# Patient Record
Sex: FEMALE | Race: WHITE | NOT HISPANIC OR LATINO | Employment: STUDENT | URBAN - METROPOLITAN AREA
[De-identification: names, ages, dates, MRNs, and addresses within clinical notes are randomized per-mention and may not be internally consistent; named-entity substitution may affect disease eponyms.]

---
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
## 2021-02-10 ENCOUNTER — OFFICE VISIT (OUTPATIENT)
Dept: URGENT CARE | Facility: PHYSICIAN GROUP | Age: 21
End: 2021-02-10
Payer: COMMERCIAL

## 2021-02-10 VITALS
DIASTOLIC BLOOD PRESSURE: 70 MMHG | WEIGHT: 155 LBS | BODY MASS INDEX: 23.49 KG/M2 | RESPIRATION RATE: 16 BRPM | OXYGEN SATURATION: 98 % | SYSTOLIC BLOOD PRESSURE: 110 MMHG | HEART RATE: 75 BPM | TEMPERATURE: 99 F | HEIGHT: 68 IN

## 2021-02-10 DIAGNOSIS — J02.0 STREP PHARYNGITIS: ICD-10-CM

## 2021-02-10 DIAGNOSIS — J02.9 PHARYNGITIS, UNSPECIFIED ETIOLOGY: ICD-10-CM

## 2021-02-10 LAB
INT CON NEG: NORMAL
INT CON POS: NORMAL
S PYO AG THROAT QL: POSITIVE

## 2021-02-10 PROCEDURE — 87880 STREP A ASSAY W/OPTIC: CPT | Performed by: PHYSICIAN ASSISTANT

## 2021-02-10 PROCEDURE — 99214 OFFICE O/P EST MOD 30 MIN: CPT | Performed by: PHYSICIAN ASSISTANT

## 2021-02-10 RX ORDER — AMOXICILLIN 500 MG/1
500 CAPSULE ORAL 2 TIMES DAILY
Qty: 20 CAPSULE | Refills: 0 | Status: SHIPPED | OUTPATIENT
Start: 2021-02-10 | End: 2021-02-20

## 2021-02-10 ASSESSMENT — ENCOUNTER SYMPTOMS
FEVER: 0
ABDOMINAL PAIN: 0
DIARRHEA: 0
MYALGIAS: 0
COUGH: 1
HEADACHES: 1
CHILLS: 0
VOMITING: 0
EYE PAIN: 0
CONSTIPATION: 0
SORE THROAT: 1
NAUSEA: 0
SHORTNESS OF BREATH: 0

## 2021-02-10 ASSESSMENT — FIBROSIS 4 INDEX: FIB4 SCORE: 0.53

## 2021-02-10 NOTE — PROGRESS NOTES
Subjective:   Avani Hutchinson is a 21 y.o. female who presents for Ear Pain (bilateral ear pain, cough, sore throat, headaches, congestion, x5 days )      HPI:  Is a 21-year-old female presents to urgent care complaining of around 5 days of sore throat, headaches, mild congestion, mild bilateral ear pressure but all seems to be getting worse around 5 days.  She has no sick contacts and note that she did contract Covid several months ago and fully recovered.  She reports that she is concerned about strep throat or a sinus infection.    Review of Systems   Constitutional: Positive for malaise/fatigue. Negative for chills and fever.   HENT: Positive for congestion, ear pain and sore throat. Negative for hearing loss and tinnitus.    Eyes: Negative for pain.   Respiratory: Positive for cough. Negative for shortness of breath.    Cardiovascular: Negative for chest pain.   Gastrointestinal: Negative for abdominal pain, constipation, diarrhea, nausea and vomiting.   Genitourinary: Negative for dysuria.   Musculoskeletal: Negative for myalgias.   Skin: Negative for rash.   Neurological: Positive for headaches.       Medications:    • Adapalene Gel  • amoxicillin Caps  • ascorbic acid Tabs  • B COMPLEX 1 PO  • BIOTIN PO  • DAYQUIL PO  • DIGESTIVE ENZYMES PO  • minocycline Caps  • norethindrone  • PEPTO-BISMOL PO  • vitamin D Tabs    Allergies: Naproxen    Problem List: Avani Hutchinson has Fatigue; Neck pain; Scoliosis of thoracic spine; Migraine with aura and with status migrainosus, not intractable; and Chronic pain of left ankle on their problem list.    Surgical History:  No past surgical history on file.    Past Social Hx: Avani Hutchinson  reports that she has never smoked. She has never used smokeless tobacco. She reports that she does not drink alcohol and does not use drugs.     Past Family Hx:  Avani Hutchinson family history includes Anxiety disorder in her paternal grandfather; Arrythmia in her maternal  "grandfather; Cancer in her maternal grandmother, mother, and paternal grandmother; Hypertension in her maternal grandfather; Thyroid in her maternal grandfather.     Problem list, medications, and allergies reviewed by myself today in Epic.     Objective:     /70 (BP Location: Right arm, Patient Position: Sitting, BP Cuff Size: Adult)   Pulse 75   Temp 37.2 °C (99 °F) (Temporal)   Resp 16   Ht 1.727 m (5' 8\")   Wt 70.3 kg (155 lb)   SpO2 98%   BMI 23.57 kg/m²     Physical Exam  Vitals reviewed.   Constitutional:       Appearance: Normal appearance.   HENT:      Head: Normocephalic and atraumatic.      Right Ear: Tympanic membrane, ear canal and external ear normal.      Left Ear: Tympanic membrane, ear canal and external ear normal.      Nose: Rhinorrhea present. No congestion.      Mouth/Throat:      Mouth: Mucous membranes are moist.      Comments: Mild posterior pharyngeal erythema, no tonsillar hypertrophy or exudate.  Scant petechiae of the palate  Eyes:      Conjunctiva/sclera: Conjunctivae normal.   Cardiovascular:      Rate and Rhythm: Normal rate and regular rhythm.   Pulmonary:      Effort: Pulmonary effort is normal.      Breath sounds: Normal breath sounds.   Musculoskeletal:      Cervical back: Normal range of motion. No rigidity.   Lymphadenopathy:      Cervical: Cervical adenopathy (Anterior) present.   Skin:     General: Skin is warm and dry.      Capillary Refill: Capillary refill takes less than 2 seconds.   Neurological:      Mental Status: She is alert and oriented to person, place, and time.         Lab Results/POC Test Results   Results for orders placed or performed in visit on 02/10/21   POCT Rapid Strep A   Result Value Ref Range    Rapid Strep Screen positive     Internal Control Positive Valid     Internal Control Negative Valid              Assessment/Plan:     Diagnosis and associated orders:     1. Pharyngitis, unspecified etiology  POCT Rapid Strep A   2. Strep pharyngitis "  amoxicillin (AMOXIL) 500 MG Cap      Comments/MDM:     • Rapid strep positive  • We will treat with amoxicillin, reviewed previous medications and she tolerated this in the past  • Patient encouraged complete full course of antibiotics  • Patient encouraged to replace any dental instruments after 3 days of antibiotics  • Discussed return precautions for peritonsillar abscess and other rare pathologies patient demonstrated understand that she will return if not improving or if getting worse         Differential diagnosis, natural history, supportive care, and indications for immediate follow-up discussed.    Advised the patient to follow-up with the primary care physician for recheck, reevaluation, and consideration of further management.    Please note that this dictation was created using voice recognition software. I have made a reasonable attempt to correct obvious errors, but I expect that there are errors of grammar and possibly content that I did not discover before finalizing the note.    This note was electronically signed by Omi Floyd PA-C

## 2021-03-05 ENCOUNTER — TELEPHONE (OUTPATIENT)
Dept: MEDICAL GROUP | Facility: IMAGING CENTER | Age: 21
End: 2021-03-05

## 2021-03-05 NOTE — TELEPHONE ENCOUNTER
Received message from patient regarding immunizations. Patient states her mychart shows she is overdue for her meningococcal B vaccine. Patient states she thought she was up to date on her vaccines. Patient states if she is due and Dr. Mcgraw thinks she should get this vaccine she would like to schedule.     Called and lm for patient to return call.

## 2021-03-08 NOTE — TELEPHONE ENCOUNTER
The meningococcal B vaccine is recommended for high risk groups, thus we can discuss at a future well visit for patient. If she is living in a dorm or going into the  she would be recommended to complete vaccine otherwise.

## 2021-03-17 NOTE — TELEPHONE ENCOUNTER
Called and spoke with patient. Notified her of Dr. Vallejo recommendations. Patient states she currently does not live in a dorm room. Patient states she is not interested in vaccine at this time. Will plan to make an appointment with Dr. Mcgraw if she has any further questions.

## 2021-04-03 ENCOUNTER — HOSPITAL ENCOUNTER (OUTPATIENT)
Facility: MEDICAL CENTER | Age: 21
End: 2021-04-03
Attending: PHYSICIAN ASSISTANT
Payer: COMMERCIAL

## 2021-04-03 ENCOUNTER — OFFICE VISIT (OUTPATIENT)
Dept: URGENT CARE | Facility: CLINIC | Age: 21
End: 2021-04-03
Payer: COMMERCIAL

## 2021-04-03 VITALS
BODY MASS INDEX: 22.73 KG/M2 | HEIGHT: 68 IN | SYSTOLIC BLOOD PRESSURE: 110 MMHG | HEART RATE: 70 BPM | OXYGEN SATURATION: 100 % | WEIGHT: 150 LBS | DIASTOLIC BLOOD PRESSURE: 64 MMHG | RESPIRATION RATE: 16 BRPM | TEMPERATURE: 98.9 F

## 2021-04-03 DIAGNOSIS — R53.83 FATIGUE, UNSPECIFIED TYPE: ICD-10-CM

## 2021-04-03 DIAGNOSIS — R68.89 FLU-LIKE SYMPTOMS: ICD-10-CM

## 2021-04-03 LAB
HETEROPH AB SER QL LA: NEGATIVE
INT CON NEG: NORMAL
INT CON NEG: NORMAL
INT CON POS: NORMAL
INT CON POS: NORMAL
S PYO AG THROAT QL: NEGATIVE

## 2021-04-03 PROCEDURE — 87880 STREP A ASSAY W/OPTIC: CPT | Performed by: PHYSICIAN ASSISTANT

## 2021-04-03 PROCEDURE — 0240U HCHG SARS-COV-2 COVID-19 NFCT DS RESP RNA 3 TRGT MIC: CPT

## 2021-04-03 PROCEDURE — 99213 OFFICE O/P EST LOW 20 MIN: CPT | Performed by: PHYSICIAN ASSISTANT

## 2021-04-03 PROCEDURE — 86308 HETEROPHILE ANTIBODY SCREEN: CPT | Performed by: PHYSICIAN ASSISTANT

## 2021-04-03 ASSESSMENT — ENCOUNTER SYMPTOMS
CHANGE IN BOWEL HABIT: 0
SORE THROAT: 0
SHORTNESS OF BREATH: 0
CHILLS: 0
FEVER: 1
SWOLLEN GLANDS: 0
NAUSEA: 0
VOMITING: 0
HEADACHES: 0
FATIGUE: 1
DIARRHEA: 0
ABDOMINAL PAIN: 0
COUGH: 0
WHEEZING: 0
MYALGIAS: 1

## 2021-04-03 ASSESSMENT — FIBROSIS 4 INDEX: FIB4 SCORE: 0.53

## 2021-04-03 NOTE — PROGRESS NOTES
Subjective:      Avani Hutchinson is a 21 y.o. female who presents with Fatigue (major fatigue and muscle aches x 2 weeks)            Fatigue  This is a new problem. Episode onset: 4 days  The problem occurs constantly. The problem has been gradually improving (slight improvement ). Associated symptoms include fatigue, a fever (100F 3 days ago ) and myalgias. Pertinent negatives include no abdominal pain, change in bowel habit, chest pain, chills, congestion, coughing, headaches, nausea, rash, sore throat, swollen glands, urinary symptoms or vomiting. Nothing aggravates the symptoms. She has tried nothing for the symptoms.     The patient states she had COVID 7 months ago and received her first  Vaccine 2 weeks ago.  She was treated for strep 3 weeks ago.       Past Medical History:   Diagnosis Date   • Migraine 11/2017   • Sinusitis 09/2018       History reviewed. No pertinent surgical history.    Family History   Problem Relation Age of Onset   • Cancer Mother         melanoma   • Cancer Maternal Grandmother         NHL, uterine cancer   • Arrythmia Maternal Grandfather    • Thyroid Maternal Grandfather    • Hypertension Maternal Grandfather    • Cancer Paternal Grandmother         melanoma   • Anxiety disorder Paternal Grandfather        Allergies   Allergen Reactions   • Naproxen Diarrhea     N/V/D       Medications, Allergies, and current problem list reviewed today in Epic    Review of Systems   Constitutional: Positive for fatigue, fever (100F 3 days ago ) and malaise/fatigue. Negative for chills.   HENT: Negative for congestion and sore throat.    Respiratory: Negative for cough, shortness of breath and wheezing.    Cardiovascular: Negative for chest pain.   Gastrointestinal: Negative for abdominal pain, change in bowel habit, diarrhea, nausea and vomiting.   Genitourinary: Negative for dysuria, frequency, hematuria and urgency.   Musculoskeletal: Positive for myalgias.   Skin: Negative for rash.  "  Neurological: Negative for headaches.     All other systems reviewed and are negative.          Objective:     /64 (BP Location: Left arm, Patient Position: Sitting, BP Cuff Size: Adult)   Pulse 70   Temp 37.2 °C (98.9 °F) (Temporal)   Resp 16   Ht 1.727 m (5' 8\")   Wt 68 kg (150 lb)   SpO2 100%   BMI 22.81 kg/m²      Physical Exam  Constitutional:       General: She is not in acute distress.  HENT:      Head: Normocephalic and atraumatic.      Nose: Nose normal.   Eyes:      Conjunctiva/sclera: Conjunctivae normal.   Cardiovascular:      Rate and Rhythm: Normal rate and regular rhythm.      Heart sounds: No murmur.   Pulmonary:      Effort: Pulmonary effort is normal. No respiratory distress.      Breath sounds: Normal breath sounds. No wheezing, rhonchi or rales.   Lymphadenopathy:      Cervical: No cervical adenopathy.   Skin:     General: Skin is warm and dry.   Neurological:      General: No focal deficit present.      Mental Status: She is alert and oriented to person, place, and time.   Psychiatric:         Mood and Affect: Mood normal.         Behavior: Behavior normal.         Thought Content: Thought content normal.         Judgment: Judgment normal.          poct strep- negative  poct mono- negative          Assessment/Plan:        1. Fatigue, unspecified type    - POCT Rapid Strep A  - POCT Mononucleosis (mono)  - CoV-2 and Flu A/B by PCR (24 hour In-House): Collect NP swab in VTM; Future    2. Flu-like symptoms    - CoV-2 and Flu A/B by PCR (24 hour In-House): Collect NP swab in VTM; Future    COVID test pending.   If not COVID, likely other viral illness.   Encouraged conservative treatment.  Isolation guidelines, conservative measures and ER precautions discussed.     COVID handout given.    Differential diagnoses, Supportive care, and indications for immediate follow-up discussed with patient.   Pathogenesis of diagnosis discussed including typical length and natural progression. "   Instructed to return to clinic or nearest emergency department for any change in condition, further concerns, or worsening of symptoms.    The patient demonstrated a good understanding and agreed with the treatment plan.    Martha Corona P.A.-C.

## 2021-04-05 LAB
FLUAV RNA SPEC QL NAA+PROBE: NEGATIVE
FLUBV RNA SPEC QL NAA+PROBE: NEGATIVE
SARS-COV-2 RNA RESP QL NAA+PROBE: NOTDETECTED
SPECIMEN SOURCE: NORMAL

## 2021-04-06 ENCOUNTER — TELEPHONE (OUTPATIENT)
Dept: URGENT CARE | Facility: CLINIC | Age: 21
End: 2021-04-06

## 2021-04-06 NOTE — TELEPHONE ENCOUNTER
Patient called asking for Lab results for COVID test. They also were wondering if the results could be added to their MyChart. I let them know that I would send the provider a message to give her a call.

## 2021-05-11 ENCOUNTER — HOSPITAL ENCOUNTER (EMERGENCY)
Facility: MEDICAL CENTER | Age: 21
End: 2021-05-11
Attending: EMERGENCY MEDICINE
Payer: COMMERCIAL

## 2021-05-11 VITALS
HEART RATE: 63 BPM | HEIGHT: 68 IN | SYSTOLIC BLOOD PRESSURE: 97 MMHG | WEIGHT: 149.25 LBS | BODY MASS INDEX: 22.62 KG/M2 | OXYGEN SATURATION: 98 % | DIASTOLIC BLOOD PRESSURE: 65 MMHG | RESPIRATION RATE: 18 BRPM | TEMPERATURE: 97 F

## 2021-05-11 DIAGNOSIS — R11.2 NAUSEA AND VOMITING, INTRACTABILITY OF VOMITING NOT SPECIFIED, UNSPECIFIED VOMITING TYPE: ICD-10-CM

## 2021-05-11 DIAGNOSIS — R10.84 GENERALIZED ABDOMINAL PAIN: ICD-10-CM

## 2021-05-11 LAB
ALBUMIN SERPL BCP-MCNC: 4.4 G/DL (ref 3.2–4.9)
ALBUMIN/GLOB SERPL: 1.5 G/DL
ALP SERPL-CCNC: 62 U/L (ref 30–99)
ALT SERPL-CCNC: 8 U/L (ref 2–50)
AMPHET UR QL SCN: NEGATIVE
ANION GAP SERPL CALC-SCNC: 9 MMOL/L (ref 7–16)
APPEARANCE UR: CLEAR
AST SERPL-CCNC: 14 U/L (ref 12–45)
BARBITURATES UR QL SCN: NEGATIVE
BASOPHILS # BLD AUTO: 0.1 % (ref 0–1.8)
BASOPHILS # BLD: 0.01 K/UL (ref 0–0.12)
BENZODIAZ UR QL SCN: NEGATIVE
BILIRUB SERPL-MCNC: 0.5 MG/DL (ref 0.1–1.5)
BILIRUB UR QL STRIP.AUTO: NEGATIVE
BUN SERPL-MCNC: 11 MG/DL (ref 8–22)
BZE UR QL SCN: NEGATIVE
CALCIUM SERPL-MCNC: 9.3 MG/DL (ref 8.4–10.2)
CANNABINOIDS UR QL SCN: NEGATIVE
CHLORIDE SERPL-SCNC: 103 MMOL/L (ref 96–112)
CO2 SERPL-SCNC: 25 MMOL/L (ref 20–33)
COLOR UR: YELLOW
CREAT SERPL-MCNC: 0.83 MG/DL (ref 0.5–1.4)
EOSINOPHIL # BLD AUTO: 0.04 K/UL (ref 0–0.51)
EOSINOPHIL NFR BLD: 0.4 % (ref 0–6.9)
ERYTHROCYTE [DISTWIDTH] IN BLOOD BY AUTOMATED COUNT: 40.4 FL (ref 35.9–50)
GLOBULIN SER CALC-MCNC: 3 G/DL (ref 1.9–3.5)
GLUCOSE SERPL-MCNC: 93 MG/DL (ref 65–99)
GLUCOSE UR STRIP.AUTO-MCNC: NEGATIVE MG/DL
HCG SERPL QL: NEGATIVE
HCT VFR BLD AUTO: 48.5 % (ref 37–47)
HGB BLD-MCNC: 15.9 G/DL (ref 12–16)
IMM GRANULOCYTES # BLD AUTO: 0.03 K/UL (ref 0–0.11)
IMM GRANULOCYTES NFR BLD AUTO: 0.3 % (ref 0–0.9)
KETONES UR STRIP.AUTO-MCNC: NEGATIVE MG/DL
LEUKOCYTE ESTERASE UR QL STRIP.AUTO: NEGATIVE
LIPASE SERPL-CCNC: 23 U/L (ref 7–58)
LYMPHOCYTES # BLD AUTO: 1.46 K/UL (ref 1–4.8)
LYMPHOCYTES NFR BLD: 16.4 % (ref 22–41)
MCH RBC QN AUTO: 30.7 PG (ref 27–33)
MCHC RBC AUTO-ENTMCNC: 32.8 G/DL (ref 33.6–35)
MCV RBC AUTO: 93.6 FL (ref 81.4–97.8)
METHADONE UR QL SCN: NEGATIVE
MICRO URNS: NORMAL
MONOCYTES # BLD AUTO: 0.56 K/UL (ref 0–0.85)
MONOCYTES NFR BLD AUTO: 6.3 % (ref 0–13.4)
NEUTROPHILS # BLD AUTO: 6.8 K/UL (ref 2–7.15)
NEUTROPHILS NFR BLD: 76.5 % (ref 44–72)
NITRITE UR QL STRIP.AUTO: NEGATIVE
NRBC # BLD AUTO: 0 K/UL
NRBC BLD-RTO: 0 /100 WBC
OPIATES UR QL SCN: NEGATIVE
OXYCODONE UR QL SCN: NEGATIVE
PCP UR QL SCN: NEGATIVE
PH UR STRIP.AUTO: 6.5 [PH] (ref 5–8)
PLATELET # BLD AUTO: 238 K/UL (ref 164–446)
PMV BLD AUTO: 10.9 FL (ref 9–12.9)
POTASSIUM SERPL-SCNC: 3.8 MMOL/L (ref 3.6–5.5)
PROPOXYPH UR QL SCN: NEGATIVE
PROT SERPL-MCNC: 7.4 G/DL (ref 6–8.2)
PROT UR QL STRIP: NEGATIVE MG/DL
RBC # BLD AUTO: 5.18 M/UL (ref 4.2–5.4)
RBC UR QL AUTO: NEGATIVE
SODIUM SERPL-SCNC: 137 MMOL/L (ref 135–145)
SP GR UR STRIP.AUTO: 1.01
WBC # BLD AUTO: 8.9 K/UL (ref 4.8–10.8)

## 2021-05-11 PROCEDURE — 81003 URINALYSIS AUTO W/O SCOPE: CPT

## 2021-05-11 PROCEDURE — 700111 HCHG RX REV CODE 636 W/ 250 OVERRIDE (IP): Performed by: EMERGENCY MEDICINE

## 2021-05-11 PROCEDURE — 84703 CHORIONIC GONADOTROPIN ASSAY: CPT

## 2021-05-11 PROCEDURE — 700105 HCHG RX REV CODE 258: Performed by: EMERGENCY MEDICINE

## 2021-05-11 PROCEDURE — 99284 EMERGENCY DEPT VISIT MOD MDM: CPT

## 2021-05-11 PROCEDURE — 96375 TX/PRO/DX INJ NEW DRUG ADDON: CPT

## 2021-05-11 PROCEDURE — 80307 DRUG TEST PRSMV CHEM ANLYZR: CPT

## 2021-05-11 PROCEDURE — 80053 COMPREHEN METABOLIC PANEL: CPT

## 2021-05-11 PROCEDURE — 83690 ASSAY OF LIPASE: CPT

## 2021-05-11 PROCEDURE — 85025 COMPLETE CBC W/AUTO DIFF WBC: CPT

## 2021-05-11 PROCEDURE — 96374 THER/PROPH/DIAG INJ IV PUSH: CPT

## 2021-05-11 RX ORDER — SODIUM CHLORIDE, SODIUM LACTATE, POTASSIUM CHLORIDE, CALCIUM CHLORIDE 600; 310; 30; 20 MG/100ML; MG/100ML; MG/100ML; MG/100ML
2000 INJECTION, SOLUTION INTRAVENOUS ONCE
Status: COMPLETED | OUTPATIENT
Start: 2021-05-11 | End: 2021-05-11

## 2021-05-11 RX ORDER — PROMETHAZINE HYDROCHLORIDE 25 MG/1
25 SUPPOSITORY RECTAL EVERY 6 HOURS PRN
Qty: 5 SUPPOSITORY | Refills: 0 | Status: SHIPPED | OUTPATIENT
Start: 2021-05-11 | End: 2021-05-18

## 2021-05-11 RX ORDER — IBUPROFEN 200 MG
200 TABLET ORAL EVERY 6 HOURS PRN
Status: SHIPPED | COMMUNITY
End: 2022-02-17

## 2021-05-11 RX ORDER — ONDANSETRON 2 MG/ML
4 INJECTION INTRAMUSCULAR; INTRAVENOUS ONCE
Status: COMPLETED | OUTPATIENT
Start: 2021-05-11 | End: 2021-05-11

## 2021-05-11 RX ORDER — PROMETHAZINE HYDROCHLORIDE 25 MG/1
25 TABLET ORAL EVERY 6 HOURS PRN
Qty: 15 TABLET | Refills: 0 | Status: SHIPPED | OUTPATIENT
Start: 2021-05-11 | End: 2021-05-18

## 2021-05-11 RX ORDER — DIPHENHYDRAMINE HYDROCHLORIDE 50 MG/ML
50 INJECTION INTRAMUSCULAR; INTRAVENOUS ONCE
Status: COMPLETED | OUTPATIENT
Start: 2021-05-11 | End: 2021-05-11

## 2021-05-11 RX ADMIN — DIPHENHYDRAMINE HYDROCHLORIDE 50 MG: 50 INJECTION INTRAMUSCULAR; INTRAVENOUS at 10:05

## 2021-05-11 RX ADMIN — SODIUM CHLORIDE, POTASSIUM CHLORIDE, SODIUM LACTATE AND CALCIUM CHLORIDE 2000 ML: 600; 310; 30; 20 INJECTION, SOLUTION INTRAVENOUS at 10:05

## 2021-05-11 RX ADMIN — ONDANSETRON 4 MG: 2 INJECTION INTRAMUSCULAR; INTRAVENOUS at 10:05

## 2021-05-11 ASSESSMENT — FIBROSIS 4 INDEX: FIB4 SCORE: 0.53

## 2021-05-11 NOTE — ED NOTES
Tolerated PO fluids without problem.  Agreeable to discharge.  Follow up instructions discussed w/ pt and her mom and understanding verbalized.  Ambulatory out of ED.  RX x 2 sent electronically.

## 2021-05-11 NOTE — ED TRIAGE NOTES
"Chief Complaint   Patient presents with   • Abdominal Pain     \"really bad intestinal pains\" started during the NOC   • Nausea/Vomiting/Diarrhea     /73   Pulse 82   Temp 36.1 °C (97 °F) (Temporal)   Resp 18   Ht 1.727 m (5' 8\")   Wt 67.7 kg (149 lb 4 oz)   SpO2 94%   BMI 22.69 kg/m²     Pt ambulated to ED w/ family member for c/o abd pain and N/V/D, denies HX of same.    Covid Screen Negative.  Pt has received Covid Vaccines x 2.            "

## 2021-05-11 NOTE — ED NOTES
Med rec updated and complete  Allergies reviewed  Interviewed pt with mother at bedside with permission from pt  Pt reports no vitamins     No current facility-administered medications on file prior to encounter.     Current Outpatient Medications on File Prior to Encounter   Medication Sig Dispense Refill   • Etonogestrel (NEXPLANON SC) Inject 1 Each under the skin see administration instructions. Every 3 years     • Minocycline HCl Micronized 1.5 % Foam Apply 1 Application topically every day. Apply's to face     • DM-Doxylamine-Acetaminophen (QC NIGHTTIME MULTI-SYMPTOM PO) Take 2 Capsules by mouth as needed (For cold symptoms).     • Aspirin-Acetaminophen-Caffeine (EXCEDRIN MIGRAINE PO) Take 2 Tablets by mouth as needed (For migraine).     • ibuprofen (MOTRIN) 200 MG Tab Take 200 mg by mouth every 6 hours as needed for Mild Pain.     • Bismuth Subsalicylate (PEPTO-BISMOL PO) Take 2 Tablets by mouth every 6 hours as needed (For upset stomach).     • Adapalene 0.3 % Gel Apply 1 Application topically at bedtime. Apply's to face  3

## 2021-05-11 NOTE — ED NOTES
Pt back to room  Reports she was unable to collect urine sample  Mother at bedside  PIV placed, labs collected    Pt dry heaving into emesis bag

## 2021-05-11 NOTE — ED PROVIDER NOTES
"ED Provider Note    CHIEF COMPLAINT  Chief Complaint   Patient presents with    Abdominal Pain     \"really bad intestinal pains\" started during the NOC    Nausea/Vomiting/Diarrhea        HPI  Avani Hutchinson is a 21 y.o. female who presents to the ED with complaints of abdominal cramps nausea vomiting diarrhea.  The patient states that last night she started having some cramps then it turned into diarrhea and vomiting.  She been having severe cramps to defined as a 10/10 type pain that tends to make her ball over then she will vomit and have diarrhea and the pain will subside.  Patient denies any overt fevers does describe chills denies any dysuria.  States the symptoms started last night about midnight.  Patient denies any other symptoms.    REVIEW OF SYSTEMS  See HPI for further details. All other systems are negative.     PAST MEDICAL HISTORY  Past Medical History:   Diagnosis Date    COVID-19     Migraine 11/2017    Sinusitis 09/2018       FAMILY HISTORY  Family History   Problem Relation Age of Onset    Cancer Mother         melanoma    Cancer Maternal Grandmother         NHL, uterine cancer    Arrythmia Maternal Grandfather     Thyroid Maternal Grandfather     Hypertension Maternal Grandfather     Cancer Paternal Grandmother         melanoma    Anxiety disorder Paternal Grandfather        SOCIAL HISTORY  Social History     Socioeconomic History    Marital status: Single     Spouse name: Not on file    Number of children: Not on file    Years of education: Not on file    Highest education level: Not on file   Occupational History    Occupation: Student     Comment: [employer]   Tobacco Use    Smoking status: Never Smoker    Smokeless tobacco: Never Used   Substance and Sexual Activity    Alcohol use: Yes    Drug use: No    Sexual activity: Never   Other Topics Concern    Behavioral problems Not Asked    Interpersonal relationships Not Asked    Sad or not enjoying activities Not Asked    Suicidal " thoughts Not Asked    Poor school performance Not Asked    Reading difficulties Not Asked    Speech difficulties Not Asked    Writing difficulties Not Asked    Inadequate sleep Not Asked    Excessive TV viewing Not Asked    Excessive video game use Not Asked    Inadequate exercise Not Asked    Sports related Not Asked    Poor diet Not Asked    Family concerns for drug/alcohol abuse Not Asked    Poor oral hygiene Not Asked    Bike safety Not Asked    Family concerns vehicle safety Not Asked   Social History Narrative    Senior in high school.     Parents - 50/50 split time.      Social Determinants of Health     Financial Resource Strain:     Difficulty of Paying Living Expenses:    Food Insecurity:     Worried About Running Out of Food in the Last Year:     Ran Out of Food in the Last Year:    Transportation Needs:     Lack of Transportation (Medical):     Lack of Transportation (Non-Medical):    Physical Activity:     Days of Exercise per Week:     Minutes of Exercise per Session:    Stress:     Feeling of Stress :    Social Connections:     Frequency of Communication with Friends and Family:     Frequency of Social Gatherings with Friends and Family:     Attends Taoist Services:     Active Member of Clubs or Organizations:     Attends Club or Organization Meetings:     Marital Status:    Intimate Partner Violence:     Fear of Current or Ex-Partner:     Emotionally Abused:     Physically Abused:     Sexually Abused:       Noa Mcgraw M.D.      SURGICAL HISTORY  History reviewed. No pertinent surgical history.    CURRENT MEDICATIONS  Home Medications       Reviewed by Dk Andrade (Pharmacy Tech) on 05/11/21 at 1025  Med List Status: Complete     Medication Last Dose Status   Adapalene 0.3 % Gel 5/9/2021 Active   Aspirin-Acetaminophen-Caffeine (EXCEDRIN MIGRAINE PO) 5/10/2021 Active   Bismuth Subsalicylate (PEPTO-BISMOL PO) 5/8/2021 Active   DM-Doxylamine-Acetaminophen (QC NIGHTTIME  "MULTI-SYMPTOM PO) >4 nights Active   Etonogestrel (NEXPLANON SC) 10/2020 Active   ibuprofen (MOTRIN) 200 MG Tab >1 week Active   Minocycline HCl Micronized 1.5 % Foam 5/10/2021 Active                  No current facility-administered medications on file prior to encounter.     Current Outpatient Medications on File Prior to Encounter   Medication Sig Dispense Refill    Etonogestrel (NEXPLANON SC) Inject 1 Each under the skin see administration instructions. Every 3 years      Minocycline HCl Micronized 1.5 % Foam Apply 1 Application topically every day. Apply's to face      DM-Doxylamine-Acetaminophen (QC NIGHTTIME MULTI-SYMPTOM PO) Take 2 Capsules by mouth as needed (For cold symptoms).      Aspirin-Acetaminophen-Caffeine (EXCEDRIN MIGRAINE PO) Take 2 Tablets by mouth as needed (For migraine).      ibuprofen (MOTRIN) 200 MG Tab Take 200 mg by mouth every 6 hours as needed for Mild Pain.      Bismuth Subsalicylate (PEPTO-BISMOL PO) Take 2 Tablets by mouth every 6 hours as needed (For upset stomach).      Adapalene 0.3 % Gel Apply 1 Application topically at bedtime. Apply's to face  3         ALLERGIES  Allergies   Allergen Reactions    Naproxen Diarrhea     N/V/D       PHYSICAL EXAM  VITAL SIGNS: BP (!) 97/65   Pulse 63   Temp 36.1 °C (97 °F) (Temporal)   Resp 18   Ht 1.727 m (5' 8\")   Wt 67.7 kg (149 lb 4 oz)   SpO2 98%   BMI 22.69 kg/m²    Pulse Oximetry was obtained. It showed a reading of Pulse Oximetry: 94 %.  I interpreted this as nonhypoxic.     Constitutional: Pale in appearance no acute distress, Non-toxic appearance.   HENT: Normocephalic, Atraumatic, Bilateral external ears normal, bilateral tympanic membranes normal, Oropharynx mucous membranes, No oral exudates, Nose normal.   Eyes:  conjunctiva is normal, there are no signs of exudate.   Neck: Supple, no cervical lymphadenopathy, no meningeal signs..   Lymphatic: No lymphadenopathy noted.   Cardiovascular: Regular rate and rhythm without murmurs " gallops or rubs.   Thorax & Lungs: Lungs are clear to auscultation bilaterally, there are no wheezes no rales. Chest wall is nontender.  Abdomen: Soft, mild tenderness throughout the whole abdomen but no point of maximal tenderness no rebound tenderness bowel sounds are hypoactive but present  Skin: Warm, Dry, No erythema,   Back: No tenderness, No CVA tenderness.  Musculoskeletal: Good range of motion in all major joints. No tenderness to palpation or major deformities noted. Intact distal pulses, no clubbing, no cyanosis, no edema   Neurologic: Alert & oriented x 3, Normal motor function, Normal sensory function, No focal deficits noted.   Psychiatric: Affect normal, Judgment normal, Mood normal.         RADIOLOGY/PROCEDURES  No orders to display       Results for orders placed or performed during the hospital encounter of 05/11/21   CBC WITH DIFFERENTIAL   Result Value Ref Range    WBC 8.9 4.8 - 10.8 K/uL    RBC 5.18 4.20 - 5.40 M/uL    Hemoglobin 15.9 12.0 - 16.0 g/dL    Hematocrit 48.5 (H) 37.0 - 47.0 %    MCV 93.6 81.4 - 97.8 fL    MCH 30.7 27.0 - 33.0 pg    MCHC 32.8 (L) 33.6 - 35.0 g/dL    RDW 40.4 35.9 - 50.0 fL    Platelet Count 238 164 - 446 K/uL    MPV 10.9 9.0 - 12.9 fL    Neutrophils-Polys 76.50 (H) 44.00 - 72.00 %    Lymphocytes 16.40 (L) 22.00 - 41.00 %    Monocytes 6.30 0.00 - 13.40 %    Eosinophils 0.40 0.00 - 6.90 %    Basophils 0.10 0.00 - 1.80 %    Immature Granulocytes 0.30 0.00 - 0.90 %    Nucleated RBC 0.00 /100 WBC    Neutrophils (Absolute) 6.80 2.00 - 7.15 K/uL    Lymphs (Absolute) 1.46 1.00 - 4.80 K/uL    Monos (Absolute) 0.56 0.00 - 0.85 K/uL    Eos (Absolute) 0.04 0.00 - 0.51 K/uL    Baso (Absolute) 0.01 0.00 - 0.12 K/uL    Immature Granulocytes (abs) 0.03 0.00 - 0.11 K/uL    NRBC (Absolute) 0.00 K/uL   COMP METABOLIC PANEL   Result Value Ref Range    Sodium 137 135 - 145 mmol/L    Potassium 3.8 3.6 - 5.5 mmol/L    Chloride 103 96 - 112 mmol/L    Co2 25 20 - 33 mmol/L    Anion Gap 9.0  7.0 - 16.0    Glucose 93 65 - 99 mg/dL    Bun 11 8 - 22 mg/dL    Creatinine 0.83 0.50 - 1.40 mg/dL    Calcium 9.3 8.4 - 10.2 mg/dL    AST(SGOT) 14 12 - 45 U/L    ALT(SGPT) 8 2 - 50 U/L    Alkaline Phosphatase 62 30 - 99 U/L    Total Bilirubin 0.5 0.1 - 1.5 mg/dL    Albumin 4.4 3.2 - 4.9 g/dL    Total Protein 7.4 6.0 - 8.2 g/dL    Globulin 3.0 1.9 - 3.5 g/dL    A-G Ratio 1.5 g/dL   LIPASE   Result Value Ref Range    Lipase 23 7 - 58 U/L   URINALYSIS,CULTURE IF INDICATED    Specimen: Urine   Result Value Ref Range    Color Yellow     Character Clear     Specific Gravity 1.015 <1.035    Ph 6.5 5.0 - 8.0    Glucose Negative Negative mg/dL    Ketones Negative Negative mg/dL    Protein Negative Negative mg/dL    Bilirubin Negative Negative    Nitrite Negative Negative    Leukocyte Esterase Negative Negative    Occult Blood Negative Negative    Micro Urine Req see below    HCG QUAL SERUM   Result Value Ref Range    Beta-Hcg Qualitative Serum Negative Negative   URINE DRUG SCREEN   Result Value Ref Range    Amphetamines Urine Negative Negative    Barbiturates Negative Negative    Benzodiazepines Negative Negative    Cocaine Metabolite Negative Negative    Methadone Negative Negative    Opiates Negative Negative    Oxycodone Negative Negative    Phencyclidine -Pcp Negative Negative    Propoxyphene Negative Negative    Cannabinoid Metab Negative Negative   ESTIMATED GFR   Result Value Ref Range    GFR If African American >60 >60 mL/min/1.73 m 2    GFR If Non African American >60 >60 mL/min/1.73 m 2         COURSE & MEDICAL DECISION MAKING  Pertinent Labs & Imaging studies reviewed. (See chart for details)  She presents with the above symptomatology.  She does appear to be dehydrated so an IV was established the patient was given nausea medications and 2 L bolus of lactated Ringer's was given.  Patient was reevaluated after that she states that she feels significantly improved she is able to try p.o. fluids at bedside.  At  this point laboratory studies are nonspecific but nonconcerning for signs of infection.  Her initial examination did have abdominal tenderness repeat examination she was no longer tender.  At this point it could very well how well have been a viral gastroenteritis.  Urinalysis appears normal.  I see no signs of THC so I do not feel that this is cannabinoid hyperemesis syndrome.  At this point I do feel it is most likely a viral syndrome/viral gastroenteritis I will give the patient a prescription of Phenergan for nausea control recommend to continue pushing fluids and return as needed.    FINAL IMPRESSION  1. Nausea and vomiting, intractability of vomiting not specified, unspecified vomiting type     2. Generalized abdominal pain            The patient will return for new or worsening symptoms and is stable at the time of discharge.    The patient is referred to a primary physician for blood pressure management, diabetic screening, and for all other preventative health concerns.        DISPOSITION:  Patient will be discharged home in stable condition.    FOLLOW UP:  Noa Mcgraw M.D.  [address] [place] [id number] [phone]    Schedule an appointment as soon as possible for a visit   As needed, Return if any symptoms worsen      OUTPATIENT MEDICATIONS:  Discharge Medication List as of 5/11/2021 12:35 PM        START taking these medications    Details   promethazine (PHENERGAN) 25 MG Tab Take 1 tablet by mouth every 6 hours as needed for Nausea/Vomiting., Disp-15 tablet, R-0, Normal      promethazine (PHENERGAN) 25 MG Suppos Insert 1 Suppository into the rectum every 6 hours as needed for Nausea/Vomiting., Disp-5 Suppository, R-0, Normal                     Electronically signed by: Candido Beckham M.D., 5/11/2021 9:56 AM

## 2021-05-11 NOTE — ED NOTES
Assuming care of pt.  Ambulatory to BR to attempt urine sample.  Clean catch instructions given and understanding verbalized.

## 2021-05-18 ENCOUNTER — TELEMEDICINE (OUTPATIENT)
Dept: MEDICAL GROUP | Facility: IMAGING CENTER | Age: 21
End: 2021-05-18
Payer: COMMERCIAL

## 2021-05-18 VITALS — HEIGHT: 68 IN | WEIGHT: 148.4 LBS | TEMPERATURE: 98.3 F | BODY MASS INDEX: 22.49 KG/M2

## 2021-05-18 DIAGNOSIS — R10.9 ABDOMINAL CRAMPING: ICD-10-CM

## 2021-05-18 DIAGNOSIS — R19.7 DIARRHEA, UNSPECIFIED TYPE: ICD-10-CM

## 2021-05-18 PROBLEM — M54.2 NECK PAIN: Status: RESOLVED | Noted: 2018-09-24 | Resolved: 2021-05-18

## 2021-05-18 PROCEDURE — 99214 OFFICE O/P EST MOD 30 MIN: CPT | Mod: 95 | Performed by: PHYSICIAN ASSISTANT

## 2021-05-18 ASSESSMENT — PAIN SCALES - GENERAL: PAINLEVEL: 1=MINIMAL PAIN

## 2021-05-18 ASSESSMENT — PATIENT HEALTH QUESTIONNAIRE - PHQ9: CLINICAL INTERPRETATION OF PHQ2 SCORE: 0

## 2021-05-18 ASSESSMENT — FIBROSIS 4 INDEX: FIB4 SCORE: .4159451654038514849

## 2021-05-18 NOTE — PROGRESS NOTES
Virtual Visit: Established Patient   This visit was conducted via Zoom using secure and encrypted videoconferencing technology. The patient was in a private location in the state of [place].    The patient's identity was confirmed and verbal consent was obtained for this virtual visit.    Subjective:   CC:   Chief Complaint   Patient presents with   • Diarrhea   • Abdominal Cramping   • Nausea       Avani Hutchinson is a 21 y.o. female presenting for evaluation and management of:    Abdominal cramping  Pt admits to diffuse lower abdominal cramping, nausea, and diarrhea. Initial episode was 1 week ago. She had 5 watery BM that morning. She did throw up twice that morning. Went to the urgent care and was given IVF and nausea medications. Helped her symptoms. She had poke four days later and had intestinal cramping 1 hour after eating and lasted 1 hour. No diarrhea and nausea during that episode. Two days later she had fish tacos and had intestinal cramping 1 hour later which lasted 2.5 hours. She had another episode yesterday - she had a sandwich and salad. Started one hour after eating - worsened after four hours. She had associated cold sweats. Cramping always associated with food. No further diarrhea or nausea. No GERD.     Depression Screening    Little interest or pleasure in doing things?  0 - not at all  Feeling down, depressed , or hopeless? 0 - not at all  Patient Health Questionnaire Score: 0      ROS   Denies any recent fevers or chills. No URI. No melena, No GERD, No hematochezia, No stool mucus. No chest pains or shortness of breath. No recent travel, abx, or new foods/medications.    Allergies   Allergen Reactions   • Naproxen Diarrhea     N/V/D       Current medicines (including changes today)  Current Outpatient Medications   Medication Sig Dispense Refill   • Etonogestrel (NEXPLANON SC) Inject 1 Each under the skin see administration instructions. Every 3 years     • Minocycline HCl Micronized 1.5 %  "Foam Apply 1 Application topically every day. Apply's to face     • Aspirin-Acetaminophen-Caffeine (EXCEDRIN MIGRAINE PO) Take 2 Tablets by mouth as needed (For migraine).     • ibuprofen (MOTRIN) 200 MG Tab Take 200 mg by mouth every 6 hours as needed for Mild Pain.     • Bismuth Subsalicylate (PEPTO-BISMOL PO) Take 2 Tablets by mouth every 6 hours as needed (For upset stomach).     • Adapalene 0.3 % Gel Apply 1 Application topically at bedtime. Apply's to face  3     No current facility-administered medications for this visit.       Patient Active Problem List    Diagnosis Date Noted   • Diarrhea 05/18/2021   • Abdominal cramping 05/18/2021   • Fatigue 09/24/2018   • Scoliosis of thoracic spine 09/24/2018   • Migraine with aura and with status migrainosus, not intractable 09/24/2018   • Chronic pain of left ankle 09/24/2018       Family History   Problem Relation Age of Onset   • Cancer Mother         melanoma   • Cancer Maternal Grandmother         NHL, uterine cancer   • Arrythmia Maternal Grandfather    • Thyroid Maternal Grandfather    • Hypertension Maternal Grandfather    • Cancer Paternal Grandmother         melanoma   • Anxiety disorder Paternal Grandfather        She  has a past medical history of COVID-19, Migraine (11/2017), Neck pain (9/24/2018), and Sinusitis (09/2018).  She  has no past surgical history on file.       Objective:   Temp 36.8 °C (98.3 °F) (Temporal)   Ht 1.727 m (5' 8\")   Wt 67.3 kg (148 lb 6.4 oz)   LMP 03/14/2021 (Exact Date)   BMI 22.56 kg/m²   RR 12    Physical Exam:  Constitutional: Alert, no distress, well-groomed.  Skin: No rashes in visible areas.  Eye: Round. Conjunctiva clear, lids normal. No icterus.   ENMT: Lips pink without lesions, good dentition, moist mucous membranes. Phonation normal.  Neck: No masses, no thyromegaly. Moves freely without pain.  Respiratory: Unlabored respiratory effort, no cough or audible wheeze  Psych: Alert and oriented x3, normal affect and " mood.     Assessment and Plan:   The following treatment plan was discussed:     1. Abdominal cramping  Post prandial with lower GI cramping. No dysentery or covid symptoms. See lab orders below. Advised patient to follow a bland diet, as she may not be giving her GI tract time to recover from the initial episode. Increase fluids, avoid dairy and alcohol.   - CBC WITH DIFFERENTIAL; Future  - Comp Metabolic Panel; Future  - Sed Rate; Future  - AMYLASE; Future  - HEPATITIS PANEL ACUTE(4 COMPONENTS); Future  - LIPASE; Future  - US-ABDOMEN COMPLETE SURVEY; Future  - CULTURE STOOL; Future  - Complete O&P; Future  - CALPROTECTIN,FECAL; Future  - GAMMA GT (GGT); Future    2. Diarrhea, unspecified type  - CBC WITH DIFFERENTIAL; Future  - Comp Metabolic Panel; Future  - Sed Rate; Future  - AMYLASE; Future  - HEPATITIS PANEL ACUTE(4 COMPONENTS); Future  - LIPASE; Future  - US-ABDOMEN COMPLETE SURVEY; Future  - CULTURE STOOL; Future  - Complete O&P; Future  - CALPROTECTIN,FECAL; Future  - GAMMA GT (GGT); Future    Follow-up: Return for As scheduled with Dr. Mcgraw.         Cynthia William PA-C ([place])  Physician Assistant Certified  [facility]    Please note that this dictation was created using voice recognition software. I have made every reasonable attempt to correct obvious errors, but I expect that there are errors of grammar and possibly content that I did not discover before finalizing the note.

## 2021-05-18 NOTE — PATIENT INSTRUCTIONS
Call [phone] to schedule abdominal ultrasound. Go to any [facility] lab to get blood work and stool studies done.    Morris Diet  A bland diet consists of foods that are often soft and do not have a lot of fat, fiber, or extra seasonings. Foods without fat, fiber, or seasoning are easier for the body to digest. They are also less likely to irritate your mouth, throat, stomach, and other parts of your digestive system. A bland diet is sometimes called a BRAT diet.  What is my plan?  Your health care provider or food and nutrition specialist (dietitian) may recommend specific changes to your diet to prevent symptoms or to treat your symptoms. These changes may include:  · Eating small meals often.  · Cooking food until it is soft enough to chew easily.  · Chewing your food well.  · Drinking fluids slowly.  · Not eating foods that are very spicy, sour, or fatty.  · Not eating citrus fruits, such as oranges and grapefruit.  What do I need to know about this diet?  · Eat a variety of foods from the bland diet food list.  · Do not follow a bland diet longer than needed.  · Ask your health care provider whether you should take vitamins or supplements.  What foods can I eat?  Grains    Hot cereals, such as cream of wheat. Rice. Bread, crackers, or tortillas made from refined white flour.  Vegetables  Canned or cooked vegetables. Mashed or boiled potatoes.  Fruits    Bananas. Applesauce. Other types of cooked or canned fruit with the skin and seeds removed, such as canned peaches or pears.  Meats and other proteins    Scrambled eggs. Creamy peanut butter or other nut butters. Lean, well-cooked meats, such as chicken or fish. Tofu. Soups or broths.  Dairy  Low-fat dairy products, such as milk, cottage cheese, or yogurt.  Beverages    Water. Herbal tea. Apple juice.  Fats and oils  Mild salad dressings. Canola or olive oil.  Sweets and desserts  Pudding. Custard. Fruit gelatin. Ice cream.  The items listed above may not be a  complete list of recommended foods and beverages. Contact a dietitian for more options.  What foods are not recommended?  Grains  Whole grain breads and cereals.  Vegetables  Raw vegetables.  Fruits  Raw fruits, especially citrus, berries, or dried fruits.  Dairy  Whole fat dairy foods.  Beverages  Caffeinated drinks. Alcohol.  Seasonings and condiments  Strongly flavored seasonings or condiments. Hot sauce. Salsa.  Other foods  Spicy foods. Fried foods. Sour foods, such as pickled or fermented foods. Foods with high sugar content. Foods high in fiber.  The items listed above may not be a complete list of foods and beverages to avoid. Contact a dietitian for more information.  Summary  · A bland diet consists of foods that are often soft and do not have a lot of fat, fiber, or extra seasonings.  · Foods without fat, fiber, or seasoning are easier for the body to digest.  · Check with your health care provider to see how long you should follow this diet plan. It is not meant to be followed for long periods.  This information is not intended to replace advice given to you by your health care provider. Make sure you discuss any questions you have with your health care provider.  Document Released: 04/10/2017 Document Revised: 01/16/2019 Document Reviewed: 01/16/2019  [employer] Patient Education © 2020 [employer]    Abdominal Pain, Adult    Many things can cause belly (abdominal) pain. Most times, belly pain is not dangerous. Many cases of belly pain can be watched and treated at home. Sometimes belly pain is serious, though. Your doctor will try to find the cause of your belly pain.  Follow these instructions at home:  · Take over-the-counter and prescription medicines only as told by your doctor. Do not take medicines that help you poop (laxatives) unless told to by your doctor.  · Drink enough fluid to keep your pee (urine) clear or pale yellow.  · Watch your belly pain for any changes.  · Keep all follow-up visits  as told by your doctor. This is important.  Contact a doctor if:  · Your belly pain changes or gets worse.  · You are not hungry, or you lose weight without trying.  · You are having trouble pooping (constipated) or have watery poop (diarrhea) for more than 2-3 days.  · You have pain when you pee or poop.  · Your belly pain wakes you up at night.  · Your pain gets worse with meals, after eating, or with certain foods.  · You are throwing up and cannot keep anything down.  · You have a fever.  Get help right away if:  · Your pain does not go away as soon as your doctor says it should.  · You cannot stop throwing up.  · Your pain is only in areas of your belly, such as the right side or the left lower part of the belly.  · You have bloody or black poop, or poop that looks like tar.  · You have very bad pain, cramping, or bloating in your belly.  · You have signs of not having enough fluid or water in your body (dehydration), such as:  ? Dark pee, very little pee, or no pee.  ? Cracked lips.  ? Dry mouth.  ? Sunken eyes.  ? Sleepiness.  ? Weakness.  This information is not intended to replace advice given to you by your health care provider. Make sure you discuss any questions you have with your health care provider.  Document Released: 06/05/2009 Document Revised: 07/07/2017 Document Reviewed: 05/31/2017  [employer] Interactive Patient Education © 2020 [employer]

## 2021-05-18 NOTE — ASSESSMENT & PLAN NOTE
Pt admits to diffuse lower abdominal cramping, nausea, and diarrhea. Initial episode was 1 week ago. She had 5 watery BM that morning. She did throw up twice that morning. Went to the urgent care and was given IVF and nausea medications. Helped her symptoms. She had poke four days later and had intestinal cramping 1 hour after eating and lasted 1 hour. No diarrhea and nausea during that episode. Two days later she had fish tacos and had intestinal cramping 1 hour later which lasted 2.5 hours. She had another episode yesterday - she had a sandwich and salad. Started one hour after eating - worsened after four hours. She had associated cold sweats. Cramping always associated with food. No further diarrhea or nausea. No GERD.

## 2021-05-27 ENCOUNTER — OFFICE VISIT (OUTPATIENT)
Dept: MEDICAL GROUP | Facility: IMAGING CENTER | Age: 21
End: 2021-05-27
Payer: COMMERCIAL

## 2021-05-27 VITALS
TEMPERATURE: 97.7 F | OXYGEN SATURATION: 100 % | SYSTOLIC BLOOD PRESSURE: 108 MMHG | RESPIRATION RATE: 12 BRPM | BODY MASS INDEX: 22.88 KG/M2 | DIASTOLIC BLOOD PRESSURE: 54 MMHG | HEART RATE: 64 BPM | WEIGHT: 151 LBS | HEIGHT: 68 IN

## 2021-05-27 DIAGNOSIS — Z83.49 FAMILY HISTORY OF THYROID DISORDER: ICD-10-CM

## 2021-05-27 DIAGNOSIS — F41.9 MILD ANXIETY: ICD-10-CM

## 2021-05-27 DIAGNOSIS — R53.83 FATIGUE, UNSPECIFIED TYPE: ICD-10-CM

## 2021-05-27 DIAGNOSIS — R71.8 ELEVATED HEMATOCRIT: ICD-10-CM

## 2021-05-27 PROCEDURE — 99215 OFFICE O/P EST HI 40 MIN: CPT | Performed by: FAMILY MEDICINE

## 2021-05-27 ASSESSMENT — FIBROSIS 4 INDEX: FIB4 SCORE: .4159451654038514849

## 2021-05-27 ASSESSMENT — PAIN SCALES - GENERAL: PAINLEVEL: NO PAIN

## 2021-05-27 NOTE — PROGRESS NOTES
SUBJECTIVE:    Chief Complaint   Patient presents with   • Fatigue     x 6 months, interested in lab work        HPI:     Avani Hutchinson is a 21 y.o. female here to review symptoms of fatigue for the past 6 months.  Has energy in morning, exhausted later in day. Up 8 am and about 2-3 pm more tired.  Sleeps about 8 hours a day.  9 am breakfast-eggs/toast or energy bar, lunch 2 pm-pasta, frozen bowl, dinner-7-8 pm- ramen sometimes. Once a day with vegetables.  Was doing better previously with healthy diet but got busy with school.  Used to exercise regularly previously.  Needs to eat a lot during the day.   Tries to drink 8 but gets 6 cups a day.   Mother wanted her to get assessed for symptoms due to family history of Hashimoto's.   Denies snoring.  She does have some seasonal allergies.  She will be going to school and working for the summer.  Deciding between premed and research, biochemistry major.  Will be also doing some camping.  Patient notes family history with anxiety.  She has had some symptoms of anxiety and lower mood.  Denies any HI/SI.  Finds that being a better lifestyle in her routines helps.  Would like labs checked for TSH, glucose, thyroid his mother has Hashimoto's.  Would like to hold off on overnight oximetry testing at this time.  Had some prior GI symptoms which improved.  She previously was on vegan diet but was difficult to sustain.    Vitals 9/6/2020 9/13/2020 2/10/2021 4/3/2021 5/11/2021   SYSTOLIC 108 113 110 110 97   DIASTOLIC 66 60 70 64 65   PULSE 96 69 75 70 63     Vitals 5/18/2021 5/27/2021   SYSTOLIC  108   DIASTOLIC  54   PULSE  64       ROS:  No recent fevers or chills. No nausea or vomiting. No diarrhea. No chest pains or shortness of breath. No lower extremity edema.    Current Outpatient Medications on File Prior to Visit   Medication Sig Dispense Refill   • Etonogestrel (NEXPLANON SC) Inject 1 Each under the skin see administration instructions. Every 3 years     •  "Minocycline HCl Micronized 1.5 % Foam Apply 1 Application topically every day. Apply's to face     • Aspirin-Acetaminophen-Caffeine (EXCEDRIN MIGRAINE PO) Take 2 Tablets by mouth as needed (For migraine).     • ibuprofen (MOTRIN) 200 MG Tab Take 200 mg by mouth every 6 hours as needed for Mild Pain.     • Bismuth Subsalicylate (PEPTO-BISMOL PO) Take 2 Tablets by mouth every 6 hours as needed (For upset stomach).     • Adapalene 0.3 % Gel Apply 1 Application topically at bedtime. Apply's to face  3     No current facility-administered medications on file prior to visit.       Allergies   Allergen Reactions   • Naproxen Diarrhea     N/V/D       Patient Active Problem List    Diagnosis Date Noted   • Diarrhea 05/18/2021   • Abdominal cramping 05/18/2021   • Fatigue 09/24/2018   • Scoliosis of thoracic spine 09/24/2018   • Migraine with aura and with status migrainosus, not intractable 09/24/2018   • Chronic pain of left ankle 09/24/2018       Past Medical History:   Diagnosis Date   • COVID-19    • Migraine 11/2017   • Neck pain 9/24/2018   • Sinusitis 09/2018         OBJECTIVE:   /54   Pulse 64   Temp 36.5 °C (97.7 °F)   Resp 12   Ht 1.727 m (5' 8\")   Wt 68.5 kg (151 lb)   LMP 05/24/2021 (Exact Date)   SpO2 100%   BMI 22.96 kg/m²   General: Well-developed well-nourished female, no acute distress  Neck: supple, no lymphadenopathy- cervical or supraclavicular, no thyromegaly  Cardiovascular: regular rate and rhythm, no murmurs, gallops, rubs  Lungs: clear to auscultation bilaterally, no wheezes, crackles, or rhonchi  Abdomen: +bowel sounds, soft, nontender, nondistended, no rebound, no guarding, no hepatosplenomegaly  Extremities: no cyanosis, clubbing, edema  Skin: Warm and dry  Psych: appropriate mood and affect    Labs/Imaging:      Ref. Range 5/11/2021 09:44   WBC Latest Ref Range: 4.8 - 10.8 K/uL 8.9   RBC Latest Ref Range: 4.20 - 5.40 M/uL 5.18   Hemoglobin Latest Ref Range: 12.0 - 16.0 g/dL 15.9 "   Hematocrit Latest Ref Range: 37.0 - 47.0 % 48.5 (H)   MCV Latest Ref Range: 81.4 - 97.8 fL 93.6   MCH Latest Ref Range: 27.0 - 33.0 pg 30.7   MCHC Latest Ref Range: 33.6 - 35.0 g/dL 32.8 (L)   RDW Latest Ref Range: 35.9 - 50.0 fL 40.4   Platelet Count Latest Ref Range: 164 - 446 K/uL 238   MPV Latest Ref Range: 9.0 - 12.9 fL 10.9   Neutrophils-Polys Latest Ref Range: 44.00 - 72.00 % 76.50 (H)   Neutrophils (Absolute) Latest Ref Range: 2.00 - 7.15 K/uL 6.80   Lymphocytes Latest Ref Range: 22.00 - 41.00 % 16.40 (L)   Lymphs (Absolute) Latest Ref Range: 1.00 - 4.80 K/uL 1.46   Monocytes Latest Ref Range: 0.00 - 13.40 % 6.30   Monos (Absolute) Latest Ref Range: 0.00 - 0.85 K/uL 0.56   Eosinophils Latest Ref Range: 0.00 - 6.90 % 0.40   Eos (Absolute) Latest Ref Range: 0.00 - 0.51 K/uL 0.04   Basophils Latest Ref Range: 0.00 - 1.80 % 0.10   Baso (Absolute) Latest Ref Range: 0.00 - 0.12 K/uL 0.01   Immature Granulocytes Latest Ref Range: 0.00 - 0.90 % 0.30   Immature Granulocytes (abs) Latest Ref Range: 0.00 - 0.11 K/uL 0.03   Nucleated RBC Latest Units: /100 WBC 0.00   NRBC (Absolute) Latest Units: K/uL 0.00   Sodium Latest Ref Range: 135 - 145 mmol/L 137   Potassium Latest Ref Range: 3.6 - 5.5 mmol/L 3.8   Chloride Latest Ref Range: 96 - 112 mmol/L 103   Co2 Latest Ref Range: 20 - 33 mmol/L 25   Anion Gap Latest Ref Range: 7.0 - 16.0  9.0   Glucose Latest Ref Range: 65 - 99 mg/dL 93   Bun Latest Ref Range: 8 - 22 mg/dL 11   Creatinine Latest Ref Range: 0.50 - 1.40 mg/dL 0.83   GFR If  Latest Ref Range: >60 mL/min/1.73 m 2 >60   GFR If Non  Latest Ref Range: >60 mL/min/1.73 m 2 >60   Calcium Latest Ref Range: 8.4 - 10.2 mg/dL 9.3   AST(SGOT) Latest Ref Range: 12 - 45 U/L 14   ALT(SGPT) Latest Ref Range: 2 - 50 U/L 8   Alkaline Phosphatase Latest Ref Range: 30 - 99 U/L 62   Total Bilirubin Latest Ref Range: 0.1 - 1.5 mg/dL 0.5   Albumin Latest Ref Range: 3.2 - 4.9 g/dL 4.4   Total Protein  Latest Ref Range: 6.0 - 8.2 g/dL 7.4   Globulin Latest Ref Range: 1.9 - 3.5 g/dL 3.0   A-G Ratio Latest Units: g/dL 1.5   Lipase Latest Ref Range: 7 - 58 U/L 23       ASSESSMENT/PLAN:    21 y.o.female     1. Fatigue, unspecified type  VITAMIN D,25 HYDROXY    TSH WITH REFLEX TO FT4    VITAMIN B12    CBC WITH DIFFERENTIAL    HEMOGLOBIN A1C    THYROID PEROXIDASE  (TPO) AB    ANTITHYROGLOBULIN AB   2. Elevated hematocrit  CBC WITH DIFFERENTIAL   3. Family history of thyroid disorder  THYROID PEROXIDASE  (TPO) AB    ANTITHYROGLOBULIN AB   4. Mild anxiety     -Fatigue-appears multifactorial.  Reviewed recent lab work.    Will obtain further lab work at this time.  Patient would like to hold off on overnight oximetry testing at this time.  -Mildly elevated hematocrit noted on recent lab work.  Discussed adequate hydration and consider overnight oximetry testing in future.   -Family history of thyroid disorder-due to fatigue symptoms will assess lab work further as above.  -Mild anxiety-appears to have some family history of anxiety.  Appears associated with recent stressors.  Counseled in detail regarding recommendations for self-care including sitting routines, healthy diet, adequate sleep, routine exercise, adequate rest and mindfulness activities.  Recommend consider healthier normal and setting goals.  We will continue to monitor at this time.    Follow-up in 3 to 4 weeks.    This medical record contains text that has been entered with the assistance of computer voice recognition and dictation software.  Therefore, it may contain unintended errors in text, spelling, punctuation, or grammar.    40 minutes were spent for visit for counseling and recommendations, review of labs and past records.

## 2021-06-03 LAB
25(OH)D3+25(OH)D2 SERPL-MCNC: 28.8 NG/ML (ref 30–100)
BASOPHILS # BLD AUTO: 0 X10E3/UL (ref 0–0.2)
BASOPHILS NFR BLD AUTO: 0 %
EOSINOPHIL # BLD AUTO: 0.1 X10E3/UL (ref 0–0.4)
EOSINOPHIL NFR BLD AUTO: 2 %
ERYTHROCYTE [DISTWIDTH] IN BLOOD BY AUTOMATED COUNT: 11.8 % (ref 11.7–15.4)
HBA1C MFR BLD: 5.4 % (ref 4.8–5.6)
HCT VFR BLD AUTO: 43.2 % (ref 34–46.6)
HGB BLD-MCNC: 13.8 G/DL (ref 11.1–15.9)
IMM GRANULOCYTES # BLD AUTO: 0 X10E3/UL (ref 0–0.1)
IMM GRANULOCYTES NFR BLD AUTO: 0 %
IMMATURE CELLS  115398: NORMAL
LYMPHOCYTES # BLD AUTO: 1.9 X10E3/UL (ref 0.7–3.1)
LYMPHOCYTES NFR BLD AUTO: 34 %
MCH RBC QN AUTO: 29.3 PG (ref 26.6–33)
MCHC RBC AUTO-ENTMCNC: 31.9 G/DL (ref 31.5–35.7)
MCV RBC AUTO: 92 FL (ref 79–97)
MONOCYTES # BLD AUTO: 0.4 X10E3/UL (ref 0.1–0.9)
MONOCYTES NFR BLD AUTO: 8 %
MORPHOLOGY BLD-IMP: NORMAL
NEUTROPHILS # BLD AUTO: 3.1 X10E3/UL (ref 1.4–7)
NEUTROPHILS NFR BLD AUTO: 56 %
NRBC BLD AUTO-RTO: NORMAL %
PLATELET # BLD AUTO: 279 X10E3/UL (ref 150–450)
RBC # BLD AUTO: 4.71 X10E6/UL (ref 3.77–5.28)
THYROGLOB AB SERPL-ACNC: <1 IU/ML (ref 0–0.9)
THYROPEROXIDASE AB SERPL-ACNC: <9 IU/ML (ref 0–34)
TSH SERPL DL<=0.005 MIU/L-ACNC: 1.46 UIU/ML (ref 0.45–4.5)
VIT B12 SERPL-MCNC: 608 PG/ML (ref 232–1245)
WBC # BLD AUTO: 5.5 X10E3/UL (ref 3.4–10.8)

## 2021-06-10 DIAGNOSIS — E55.9 VITAMIN D INSUFFICIENCY: ICD-10-CM

## 2021-06-25 ENCOUNTER — APPOINTMENT (OUTPATIENT)
Dept: MEDICAL GROUP | Facility: IMAGING CENTER | Age: 21
End: 2021-06-25
Payer: COMMERCIAL

## 2021-08-09 ENCOUNTER — HOSPITAL ENCOUNTER (OUTPATIENT)
Facility: MEDICAL CENTER | Age: 21
End: 2021-08-09
Attending: NURSE PRACTITIONER
Payer: COMMERCIAL

## 2021-08-09 ENCOUNTER — EH NON-PROVIDER (OUTPATIENT)
Dept: OCCUPATIONAL MEDICINE | Facility: CLINIC | Age: 21
End: 2021-08-09
Payer: COMMERCIAL

## 2021-08-09 DIAGNOSIS — Z02.1 PRE-EMPLOYMENT HEALTH SCREENING EXAMINATION: Primary | ICD-10-CM

## 2021-08-09 DIAGNOSIS — Z02.1 PRE-EMPLOYMENT HEALTH SCREENING EXAMINATION: ICD-10-CM

## 2021-08-09 PROCEDURE — 86480 TB TEST CELL IMMUN MEASURE: CPT | Performed by: NURSE PRACTITIONER

## 2021-08-10 LAB
GAMMA INTERFERON BACKGROUND BLD IA-ACNC: 0.03 IU/ML
M TB IFN-G BLD-IMP: NEGATIVE
M TB IFN-G CD4+ BCKGRND COR BLD-ACNC: 0 IU/ML
MITOGEN IGNF BCKGRD COR BLD-ACNC: >10 IU/ML
QFT TB2 - NIL TBQ2: 0.01 IU/ML

## 2021-08-25 ENCOUNTER — EH NON-PROVIDER (OUTPATIENT)
Dept: OCCUPATIONAL MEDICINE | Facility: CLINIC | Age: 21
End: 2021-08-25
Payer: COMMERCIAL

## 2021-08-25 DIAGNOSIS — Z71.85 IMMUNIZATION COUNSELING: ICD-10-CM

## 2021-09-30 ENCOUNTER — APPOINTMENT (RX ONLY)
Dept: URBAN - METROPOLITAN AREA CLINIC 35 | Facility: CLINIC | Age: 21
Setting detail: DERMATOLOGY
End: 2021-09-30

## 2021-09-30 DIAGNOSIS — L70.0 ACNE VULGARIS: ICD-10-CM

## 2021-09-30 PROCEDURE — 99213 OFFICE O/P EST LOW 20 MIN: CPT

## 2021-09-30 PROCEDURE — ? COUNSELING

## 2021-09-30 PROCEDURE — ? PRESCRIPTION

## 2021-09-30 PROCEDURE — ? TREATMENT REGIMEN

## 2021-09-30 RX ORDER — MINOCYCLINE 40 MG/G
THIN LAYER AEROSOL, FOAM TOPICAL QD
Qty: 30 | Refills: 6 | Status: ERX | COMMUNITY
Start: 2021-09-30

## 2021-09-30 RX ADMIN — MINOCYCLINE THIN LAYER: 40 AEROSOL, FOAM TOPICAL at 00:00

## 2021-09-30 ASSESSMENT — LOCATION DETAILED DESCRIPTION DERM
LOCATION DETAILED: RIGHT INFERIOR CENTRAL MALAR CHEEK
LOCATION DETAILED: LEFT CENTRAL MALAR CHEEK

## 2021-09-30 ASSESSMENT — LOCATION SIMPLE DESCRIPTION DERM
LOCATION SIMPLE: LEFT CHEEK
LOCATION SIMPLE: RIGHT CHEEK

## 2021-09-30 ASSESSMENT — LOCATION ZONE DERM: LOCATION ZONE: FACE

## 2021-09-30 NOTE — PROCEDURE: TREATMENT REGIMEN
Hide Panoxyl Products: No
Detail Level: Zone
Action 4: Continue
Initiate Regimen: Arazlo qhs, samples given - try instead of Adapalene
Continue Regimen: Amzeeq qd

## 2021-10-15 ENCOUNTER — OFFICE VISIT (OUTPATIENT)
Dept: MEDICAL GROUP | Facility: IMAGING CENTER | Age: 21
End: 2021-10-15
Payer: COMMERCIAL

## 2021-10-15 VITALS
DIASTOLIC BLOOD PRESSURE: 72 MMHG | SYSTOLIC BLOOD PRESSURE: 106 MMHG | RESPIRATION RATE: 12 BRPM | TEMPERATURE: 97.3 F | HEIGHT: 68 IN | WEIGHT: 149 LBS | BODY MASS INDEX: 22.58 KG/M2 | HEART RATE: 74 BPM | OXYGEN SATURATION: 96 %

## 2021-10-15 DIAGNOSIS — F41.9 MILD ANXIETY: ICD-10-CM

## 2021-10-15 DIAGNOSIS — Z23 NEED FOR VACCINATION: ICD-10-CM

## 2021-10-15 DIAGNOSIS — R53.83 FATIGUE, UNSPECIFIED TYPE: ICD-10-CM

## 2021-10-15 PROCEDURE — 90686 IIV4 VACC NO PRSV 0.5 ML IM: CPT | Performed by: FAMILY MEDICINE

## 2021-10-15 PROCEDURE — 99213 OFFICE O/P EST LOW 20 MIN: CPT | Mod: 25 | Performed by: FAMILY MEDICINE

## 2021-10-15 PROCEDURE — 90471 IMMUNIZATION ADMIN: CPT | Performed by: FAMILY MEDICINE

## 2021-10-15 RX ORDER — CETIRIZINE HYDROCHLORIDE 10 MG/1
10 TABLET ORAL DAILY
COMMUNITY
End: 2021-12-02

## 2021-10-15 RX ORDER — FLUTICASONE PROPIONATE 50 MCG
1 SPRAY, SUSPENSION (ML) NASAL DAILY
COMMUNITY
End: 2022-02-17

## 2021-10-15 RX ORDER — GUAIFENESIN 600 MG/1
600 TABLET, EXTENDED RELEASE ORAL EVERY 12 HOURS
COMMUNITY
End: 2021-12-02

## 2021-10-15 ASSESSMENT — PAIN SCALES - GENERAL: PAINLEVEL: NO PAIN

## 2021-10-15 ASSESSMENT — FIBROSIS 4 INDEX: FIB4 SCORE: 0.35

## 2021-10-15 NOTE — PROGRESS NOTES
SUBJECTIVE:    Chief Complaint   Patient presents with   • Fatigue     improved    • Anxiety     worse       HPI:    Avani Hutchinson is a 21 y.o. female with fatigue and anxiety here for follow up of symptoms.     Asked that her fatigue symptoms are better overall.  She feels fine now.  Just started on magnesium citrate.  Previously she thought she wanted to start on medication therapy.  Felt depressed when having diarrhea symptoms.  Had diarrhea for 3 weeks previously.  Stopped having diarrhea about 2 weeks ago.  She currently works and is involved in several other activities.  She has talked with a counselor in the past.  States then she was started on Augmentin for sinus issues and then doxycycline.  Had diarrhea prior to antibiotic therapy.  She thinks she might of had bronchitis as well.  Was tested negative for Covid-19.  Changed her probiotics.  Taking vitamin D and B.  Regular water intake and exercise which has helped with her overall wellbeing.        ROS:  No recent fevers or chills. No nausea or vomiting. No chest pains or shortness of breath. No lower extremity edema.    Current Outpatient Medications on File Prior to Visit   Medication Sig Dispense Refill   • fluticasone (FLONASE) 50 MCG/ACT nasal spray Administer 1 Spray into affected nostril(S) every day.     • guaiFENesin ER (MUCINEX) 600 MG TABLET SR 12 HR Take 600 mg by mouth every 12 hours.     • cetirizine (ZYRTEC) 10 MG Tab Take 10 mg by mouth every day.     • Etonogestrel (NEXPLANON SC) Inject 1 Each under the skin see administration instructions. Every 3 years     • Minocycline HCl Micronized 1.5 % Foam Apply 1 Application topically every day. Apply's to face     • Aspirin-Acetaminophen-Caffeine (EXCEDRIN MIGRAINE PO) Take 2 Tablets by mouth as needed (For migraine).     • ibuprofen (MOTRIN) 200 MG Tab Take 200 mg by mouth every 6 hours as needed for Mild Pain.     • Bismuth Subsalicylate (PEPTO-BISMOL PO) Take 2 Tablets by mouth every 6  "hours as needed (For upset stomach).     • Adapalene 0.3 % Gel Apply 1 Application topically at bedtime. Apply's to face (Patient not taking: Reported on 10/15/2021)  3     No current facility-administered medications on file prior to visit.       Allergies   Allergen Reactions   • Naproxen Diarrhea     N/V/D       Patient Active Problem List    Diagnosis Date Noted   • Diarrhea 05/18/2021   • Abdominal cramping 05/18/2021   • Fatigue 09/24/2018   • Scoliosis of thoracic spine 09/24/2018   • Migraine with aura and with status migrainosus, not intractable 09/24/2018   • Chronic pain of left ankle 09/24/2018       Past Medical History:   Diagnosis Date   • COVID-19    • Migraine 11/2017   • Neck pain 9/24/2018   • Sinusitis 09/2018       OBJECTIVE:   /72   Pulse 74   Temp 36.3 °C (97.3 °F)   Resp 12   Ht 1.727 m (5' 8\")   Wt 67.6 kg (149 lb)   LMP 09/26/2021 (Exact Date) Comment: nexplanon implant  SpO2 96%   BMI 22.66 kg/m²   General: Well-developed well-nourished female, no acute distress  Neck: supple, no lymphadenopathy- cervical or supraclavicular, no thyromegaly  Cardiovascular: regular rate and rhythm, no murmurs, gallops, rubs  Lungs: clear to auscultation bilaterally, no wheezes, crackles, or rhonchi  Abdomen: +bowel sounds, soft, minimally tender of right mid lower region to palpation, nondistended, no rebound, no guarding, no hepatosplenomegaly  Extremities: no cyanosis, clubbing, edema  Skin: Warm and dry  Psych: appropriate mood and affect      ASSESSMENT/PLAN:    21 y.o.female with fatigue and anxiety.     1. Fatigue, unspecified type     2. Mild anxiety     3. Need for vaccination  INFLUENZA VACCINE QUAD INJ (PF)   -Fatigue symptoms currently improved.  Continue with healthy diet, routine exercise and healthy routines.  -Mild anxiety-component associated with current stressors.  Counseled on management of current symptoms.  Recommend consider seeking counseling therapy. Recommend continue " working on self care with adequate nutrition and fluid intake, routine exercise, adequate sleep and stress management.   Consider reduction of her current activities.  Patient may consider acupuncture therapy may also consider medication therapy in future as needed.  She will continue on magnesium supplement at this time.    Return in about 6 weeks (around 11/26/2021).    This medical record contains text that has been entered with the assistance of computer voice recognition and dictation software.  Therefore, it may contain unintended errors in text, spelling, punctuation, or grammar.    20 minutes were spent for visit regarding counseling coordination of care as above.

## 2021-10-22 ENCOUNTER — EH NON-PROVIDER (OUTPATIENT)
Dept: OCCUPATIONAL MEDICINE | Facility: CLINIC | Age: 21
End: 2021-10-22

## 2021-10-22 PROCEDURE — 94375 RESPIRATORY FLOW VOLUME LOOP: CPT | Performed by: NURSE PRACTITIONER

## 2021-10-26 ENCOUNTER — RX ONLY (OUTPATIENT)
Age: 21
Setting detail: RX ONLY
End: 2021-10-26

## 2021-10-26 RX ORDER — TAZAROTENE 0.45 MG/G
THIN LAYER LOTION TOPICAL QHS
Qty: 45 | Refills: 3 | Status: ERX | COMMUNITY
Start: 2021-10-25

## 2021-11-26 ENCOUNTER — PHARMACY VISIT (OUTPATIENT)
Dept: PHARMACY | Facility: MEDICAL CENTER | Age: 21
End: 2021-11-26
Payer: COMMERCIAL

## 2021-11-26 PROCEDURE — RXMED WILLOW AMBULATORY MEDICATION CHARGE: Performed by: INTERNAL MEDICINE

## 2021-11-26 RX ORDER — RNA INGREDIENT BNT-162B2 0.23 G/1.8ML
0.3 INJECTION, SUSPENSION INTRAMUSCULAR
Qty: 0.3 ML | Refills: 0 | Status: SHIPPED | OUTPATIENT
Start: 2021-11-26 | End: 2021-12-02

## 2021-12-02 ENCOUNTER — OFFICE VISIT (OUTPATIENT)
Dept: MEDICAL GROUP | Facility: IMAGING CENTER | Age: 21
End: 2021-12-02
Payer: COMMERCIAL

## 2021-12-02 VITALS
HEIGHT: 68 IN | OXYGEN SATURATION: 98 % | SYSTOLIC BLOOD PRESSURE: 98 MMHG | WEIGHT: 149 LBS | TEMPERATURE: 98.3 F | HEART RATE: 68 BPM | DIASTOLIC BLOOD PRESSURE: 56 MMHG | RESPIRATION RATE: 12 BRPM | BODY MASS INDEX: 22.58 KG/M2

## 2021-12-02 DIAGNOSIS — F41.9 MILD ANXIETY: ICD-10-CM

## 2021-12-02 PROCEDURE — 99212 OFFICE O/P EST SF 10 MIN: CPT | Performed by: FAMILY MEDICINE

## 2021-12-02 ASSESSMENT — FIBROSIS 4 INDEX: FIB4 SCORE: 0.35

## 2021-12-02 ASSESSMENT — PAIN SCALES - GENERAL: PAINLEVEL: NO PAIN

## 2021-12-02 NOTE — PROGRESS NOTES
"  SUBJECTIVE:    Chief Complaint   Patient presents with   • Anxiety     improved       HPI:     Avani Hutchinson is a 21 y.o. female with anxiety.   Has improved. Going to therapy. Taking magnesium, may be helping.   Feels more stable. Not involved in as much now.   Listening to her body. Mood is more stable.   Will be study for [employer] -goal to take in April currently.   Decreased classes and work.   3 days a week- work out.   Has questions about BP being on lower end.     ROS:  No recent fevers or chills. No nausea or vomiting. No diarrhea. No chest pains or shortness of breath. No lower extremity edema.    Current Outpatient Medications on File Prior to Visit   Medication Sig Dispense Refill   • fluticasone (FLONASE) 50 MCG/ACT nasal spray Administer 1 Spray into affected nostril(S) every day.     • Etonogestrel (NEXPLANON SC) Inject 1 Each under the skin see administration instructions. Every 3 years     • Aspirin-Acetaminophen-Caffeine (EXCEDRIN MIGRAINE PO) Take 2 Tablets by mouth as needed (For migraine).     • ibuprofen (MOTRIN) 200 MG Tab Take 200 mg by mouth every 6 hours as needed for Mild Pain.     • Adapalene 0.3 % Gel Apply 1 Application topically at bedtime. Apply's to face (Patient not taking: Reported on 10/15/2021)  3     No current facility-administered medications on file prior to visit.       Allergies   Allergen Reactions   • Naproxen Diarrhea     N/V/D       Patient Active Problem List    Diagnosis Date Noted   • Diarrhea 05/18/2021   • Abdominal cramping 05/18/2021   • Fatigue 09/24/2018   • Scoliosis of thoracic spine 09/24/2018   • Migraine with aura and with status migrainosus, not intractable 09/24/2018   • Chronic pain of left ankle 09/24/2018       Past Medical History:   Diagnosis Date   • COVID-19    • Migraine 11/2017   • Neck pain 9/24/2018   • Sinusitis 09/2018       OBJECTIVE:   BP (!) 98/56   Pulse 68   Temp 36.8 °C (98.3 °F)   Resp 12   Ht 1.727 m (5' 8\")   Wt 67.6 kg (149 " lb)   SpO2 98%   BMI 22.66 kg/m²   General: Well-developed well-nourished female, no acute distress  Neck: supple, no lymphadenopathy- cervical or supraclavicular, no thyromegaly  Cardiovascular: regular rate and rhythm, no murmurs, gallops, rubs  Lungs: clear to auscultation bilaterally, no wheezes, crackles, or rhonchi  Abdomen: +bowel sounds, soft, nontender, nondistended, no rebound, no guarding, no hepatosplenomegaly  Extremities: no cyanosis, clubbing, edema  Skin: Warm and dry  Psych: appropriate mood and affect      ASSESSMENT/PLAN:      21 y.o.female     1. Mild anxiety     Overall improved.   Continue routine counseling therapy. May continue on magnesium as needed. Discussed eating magnesium rich food.   Recommend continue working on self care with adequate nutrition and fluid intake, routine exercise, adequate sleep and stress management.   For BP support, recommend adequate hydration, consider pressure stockings and avoid hot whether and direct sunlight.     Return if symptoms worsen or fail to improve.    This medical record contains text that has been entered with the assistance of computer voice recognition and dictation software.  Therefore, it may contain unintended errors in text, spelling, punctuation, or grammar.

## 2022-02-17 ENCOUNTER — OFFICE VISIT (OUTPATIENT)
Dept: URGENT CARE | Facility: CLINIC | Age: 22
End: 2022-02-17
Payer: COMMERCIAL

## 2022-02-17 VITALS
DIASTOLIC BLOOD PRESSURE: 68 MMHG | TEMPERATURE: 99.7 F | WEIGHT: 145 LBS | HEART RATE: 70 BPM | RESPIRATION RATE: 14 BRPM | SYSTOLIC BLOOD PRESSURE: 104 MMHG | OXYGEN SATURATION: 98 % | HEIGHT: 68 IN | BODY MASS INDEX: 21.98 KG/M2

## 2022-02-17 DIAGNOSIS — B35.9 TINEA: ICD-10-CM

## 2022-02-17 PROCEDURE — 99213 OFFICE O/P EST LOW 20 MIN: CPT | Performed by: PHYSICIAN ASSISTANT

## 2022-02-17 RX ORDER — CLOTRIMAZOLE 1 %
CREAM (GRAM) TOPICAL
Qty: 28 G | Refills: 1 | Status: SHIPPED | OUTPATIENT
Start: 2022-02-17 | End: 2023-01-06

## 2022-02-17 ASSESSMENT — FIBROSIS 4 INDEX: FIB4 SCORE: 0.37

## 2022-02-17 ASSESSMENT — ENCOUNTER SYMPTOMS
FEVER: 0
EYE DISCHARGE: 0
EYE REDNESS: 0
COUGH: 0

## 2022-02-17 NOTE — PROGRESS NOTES
"Subjective     Avani Hutchinson is a 22 y.o. female who presents with Rash (X 2 weeks, LT side right on bra line, some pain and itching.)            Patient is a 22-year-old female who presents to urgent care with small itchy patch beneath her left breast.  Patient reports she thought this was secondary to her new bra however has been seeing slight improvement with lotion.  Patient denies any new soaps, lotions, detergents.  She denies any pain to this region.  She denies any animals in her house.    Rash  This is a new problem. The current episode started 1 to 4 weeks ago. The rash is characterized by redness, scaling and itchiness. Pertinent negatives include no cough or fever. Treatments tried: As above.       Review of Systems   Constitutional: Negative for fever.   Eyes: Negative for discharge and redness.   Respiratory: Negative for cough.    Skin: Positive for itching and rash.   All other systems reviewed and are negative.             Objective     /68   Pulse 70   Temp 37.6 °C (99.7 °F) (Temporal)   Resp 14   Ht 1.727 m (5' 8\")   Wt 65.8 kg (145 lb)   LMP 02/10/2022 (Approximate)   SpO2 98%   Breastfeeding No   BMI 22.05 kg/m²    PMH:  has a past medical history of COVID-19, Migraine (11/2017), Neck pain (9/24/2018), and Sinusitis (09/2018).  MEDS: Reviewed .   ALLERGIES:   Allergies   Allergen Reactions   • Naproxen Diarrhea     N/V/D     SURGHX: History reviewed. No pertinent surgical history.  SOCHX:  reports that she has never smoked. She has never used smokeless tobacco. She reports current alcohol use of about 2.4 oz of alcohol per week. She reports that she does not use drugs.  FH: Family history was reviewed, no pertinent findings to report    Physical Exam  Vitals reviewed.   Constitutional:       General: She is not in acute distress.     Appearance: She is well-developed.   HENT:      Head: Normocephalic and atraumatic.   Eyes:      Conjunctiva/sclera: Conjunctivae normal.      " Pupils: Pupils are equal, round, and reactive to light.   Neck:      Trachea: No tracheal deviation.   Cardiovascular:      Rate and Rhythm: Normal rate.   Pulmonary:      Effort: Pulmonary effort is normal.   Musculoskeletal:         General: Normal range of motion.      Cervical back: Normal range of motion and neck supple.   Skin:     General: Skin is warm.      Findings: Rash present.      Comments: Left anterior chest below breast with an ovoid circular patch with raised edges and central clearing.  Pruritic in nature and erythematous.  Nontender.  No other areas with rash.   Neurological:      Mental Status: She is alert and oriented to person, place, and time.      Coordination: Coordination normal.   Psychiatric:         Behavior: Behavior normal.         Thought Content: Thought content normal.         Judgment: Judgment normal.                             Assessment & Plan        1. Tinea  - clotrimazole (LOTRIMIN) 1 % Cream; Apply thin layer to affected skin area BID for 2 weeks.  Dispense: 28 g; Refill: 1            We will start the patient on the above at this time and encourage patient to utilize head and shoulders as a body wash 1-2 times a week.  Avoid scratching.  Appropriate PPE worn at all times by provider.   Pt. Had face mask on throughout entirety of the visit other than oropharyngeal examination today.     DDX, Supportive care, and indications for immediate follow-up discussed with patient.    Instructed to return to clinic or nearest emergency department if we are not available for any change in condition, further concerns, or worsening of symptoms.    The patient and/or guardian demonstrated a good understanding and agreed with the treatment plan.    Please note that this dictation was created using voice recognition software. I have made every reasonable attempt to correct obvious errors, but I expect that there are errors of grammar and possibly content that I did not discover before  finalizing the note.

## 2022-06-20 ENCOUNTER — APPOINTMENT (RX ONLY)
Dept: URBAN - METROPOLITAN AREA CLINIC 35 | Facility: CLINIC | Age: 22
Setting detail: DERMATOLOGY
End: 2022-06-20

## 2022-06-20 DIAGNOSIS — L70.0 ACNE VULGARIS: ICD-10-CM

## 2022-06-20 PROBLEM — D23.71 OTHER BENIGN NEOPLASM OF SKIN OF RIGHT LOWER LIMB, INCLUDING HIP: Status: ACTIVE | Noted: 2022-06-20

## 2022-06-20 PROCEDURE — ? TREATMENT REGIMEN

## 2022-06-20 PROCEDURE — ? COUNSELING

## 2022-06-20 PROCEDURE — ? PRESCRIPTION

## 2022-06-20 PROCEDURE — 99213 OFFICE O/P EST LOW 20 MIN: CPT

## 2022-06-20 RX ORDER — TAZAROTENE 0.45 MG/G
THIN LAYER LOTION TOPICAL QHS
Qty: 45 | Refills: 11 | Status: ERX

## 2022-06-20 ASSESSMENT — LOCATION SIMPLE DESCRIPTION DERM
LOCATION SIMPLE: LEFT CHEEK
LOCATION SIMPLE: RIGHT CHEEK

## 2022-06-20 ASSESSMENT — LOCATION ZONE DERM: LOCATION ZONE: FACE

## 2022-06-20 ASSESSMENT — LOCATION DETAILED DESCRIPTION DERM
LOCATION DETAILED: LEFT CENTRAL MALAR CHEEK
LOCATION DETAILED: RIGHT INFERIOR CENTRAL MALAR CHEEK

## 2022-06-20 NOTE — PROCEDURE: COUNSELING
Tetracycline Pregnancy And Lactation Text: This medication is Pregnancy Category D and not consider safe during pregnancy. It is also excreted in breast milk.
Topical Sulfur Applications Counseling: Topical Sulfur Counseling: Patient counseled that this medication may cause skin irritation or allergic reactions.  In the event of skin irritation, the patient was advised to reduce the amount of the drug applied or use it less frequently.   The patient verbalized understanding of the proper use and possible adverse effects of topical sulfur application.  All of the patient's questions and concerns were addressed.
Erythromycin Counseling:  I discussed with the patient the risks of erythromycin including but not limited to GI upset, allergic reaction, drug rash, diarrhea, increase in liver enzymes, and yeast infections.
Azithromycin Pregnancy And Lactation Text: This medication is considered safe during pregnancy and is also secreted in breast milk.
Detail Level: Simple
Dapsone Counseling: I discussed with the patient the risks of dapsone including but not limited to hemolytic anemia, agranulocytosis, rashes, methemoglobinemia, kidney failure, peripheral neuropathy, headaches, GI upset, and liver toxicity.  Patients who start dapsone require monitoring including baseline LFTs and weekly CBCs for the first month, then every month thereafter.  The patient verbalized understanding of the proper use and possible adverse effects of dapsone.  All of the patient's questions and concerns were addressed.
Topical Retinoid Pregnancy And Lactation Text: This medication is Pregnancy Category C. It is unknown if this medication is excreted in breast milk.
Birth Control Pills Pregnancy And Lactation Text: This medication should be avoided if pregnant and for the first 30 days post-partum.
High Dose Vitamin A Counseling: Side effects reviewed, pt to contact office should one occur.
Spironolactone Counseling: Patient advised regarding risks of diarrhea, abdominal pain, hyperkalemia, birth defects (for female patients), liver toxicity and renal toxicity. The patient may need blood work to monitor liver and kidney function and potassium levels while on therapy. The patient verbalized understanding of the proper use and possible adverse effects of spironolactone.  All of the patient's questions and concerns were addressed.
Tazorac Counseling:  Patient advised that medication is irritating and drying.  Patient may need to apply sparingly and wash off after an hour before eventually leaving it on overnight.  The patient verbalized understanding of the proper use and possible adverse effects of tazorac.  All of the patient's questions and concerns were addressed.
Topical Sulfur Applications Pregnancy And Lactation Text: This medication is Pregnancy Category C and has an unknown safety profile during pregnancy. It is unknown if this topical medication is excreted in breast milk.
High Dose Vitamin A Pregnancy And Lactation Text: High dose vitamin A therapy is contraindicated during pregnancy and breast feeding.
Tazorac Pregnancy And Lactation Text: This medication is not safe during pregnancy. It is unknown if this medication is excreted in breast milk.
Benzoyl Peroxide Counseling: Patient counseled that medicine may cause skin irritation and bleach clothing.  In the event of skin irritation, the patient was advised to reduce the amount of the drug applied or use it less frequently.   The patient verbalized understanding of the proper use and possible adverse effects of benzoyl peroxide.  All of the patient's questions and concerns were addressed.
Erythromycin Pregnancy And Lactation Text: This medication is Pregnancy Category B and is considered safe during pregnancy. It is also excreted in breast milk.
Bactrim Counseling:  I discussed with the patient the risks of sulfa antibiotics including but not limited to GI upset, allergic reaction, drug rash, diarrhea, dizziness, photosensitivity, and yeast infections.  Rarely, more serious reactions can occur including but not limited to aplastic anemia, agranulocytosis, methemoglobinemia, blood dyscrasias, liver or kidney failure, lung infiltrates or desquamative/blistering drug rashes.
Include Pregnancy/Lactation Warning?: No
Bactrim Pregnancy And Lactation Text: This medication is Pregnancy Category D and is known to cause fetal risk.  It is also excreted in breast milk.
Isotretinoin Counseling: Patient should get monthly blood tests, not donate blood, not drive at night if vision affected, not share medication, and not undergo elective surgery for 6 months after tx completed. Side effects reviewed, pt to contact office should one occur.
Spironolactone Pregnancy And Lactation Text: This medication can cause feminization of the male fetus and should be avoided during pregnancy. The active metabolite is also found in breast milk.
Doxycycline Counseling:  Patient counseled regarding possible photosensitivity and increased risk for sunburn.  Patient instructed to avoid sunlight, if possible.  When exposed to sunlight, patients should wear protective clothing, sunglasses, and sunscreen.  The patient was instructed to call the office immediately if the following severe adverse effects occur:  hearing changes, easy bruising/bleeding, severe headache, or vision changes.  The patient verbalized understanding of the proper use and possible adverse effects of doxycycline.  All of the patient's questions and concerns were addressed.
Dapsone Pregnancy And Lactation Text: This medication is Pregnancy Category C and is not considered safe during pregnancy or breast feeding.
Benzoyl Peroxide Pregnancy And Lactation Text: This medication is Pregnancy Category C. It is unknown if benzoyl peroxide is excreted in breast milk.
Minocycline Counseling: Patient advised regarding possible photosensitivity and discoloration of the teeth, skin, lips, tongue and gums.  Patient instructed to avoid sunlight, if possible.  When exposed to sunlight, patients should wear protective clothing, sunglasses, and sunscreen.  The patient was instructed to call the office immediately if the following severe adverse effects occur:  hearing changes, easy bruising/bleeding, severe headache, or vision changes.  The patient verbalized understanding of the proper use and possible adverse effects of minocycline.  All of the patient's questions and concerns were addressed.
Topical Clindamycin Counseling: Patient counseled that this medication may cause skin irritation or allergic reactions.  In the event of skin irritation, the patient was advised to reduce the amount of the drug applied or use it less frequently.   The patient verbalized understanding of the proper use and possible adverse effects of clindamycin.  All of the patient's questions and concerns were addressed.
Tetracycline Counseling: Patient counseled regarding possible photosensitivity and increased risk for sunburn.  Patient instructed to avoid sunlight, if possible.  When exposed to sunlight, patients should wear protective clothing, sunglasses, and sunscreen.  The patient was instructed to call the office immediately if the following severe adverse effects occur:  hearing changes, easy bruising/bleeding, severe headache, or vision changes.  The patient verbalized understanding of the proper use and possible adverse effects of tetracycline.  All of the patient's questions and concerns were addressed. Patient understands to avoid pregnancy while on therapy due to potential birth defects.
Doxycycline Pregnancy And Lactation Text: This medication is Pregnancy Category D and not consider safe during pregnancy. It is also excreted in breast milk but is considered safe for shorter treatment courses.
Birth Control Pills Counseling: Birth Control Pill Counseling: I discussed with the patient the potential side effects of OCPs including but not limited to increased risk of stroke, heart attack, thrombophlebitis, deep venous thrombosis, hepatic adenomas, breast changes, GI upset, headaches, and depression.  The patient verbalized understanding of the proper use and possible adverse effects of OCPs. All of the patient's questions and concerns were addressed.
Sarecycline Counseling: Patient advised regarding possible photosensitivity and discoloration of the teeth, skin, lips, tongue and gums.  Patient instructed to avoid sunlight, if possible.  When exposed to sunlight, patients should wear protective clothing, sunglasses, and sunscreen.  The patient was instructed to call the office immediately if the following severe adverse effects occur:  hearing changes, easy bruising/bleeding, severe headache, or vision changes.  The patient verbalized understanding of the proper use and possible adverse effects of sarecycline.  All of the patient's questions and concerns were addressed.
Topical Retinoid counseling:  Patient advised to apply a pea-sized amount only at bedtime and wait 30 minutes after washing their face before applying.  If too drying, patient may add a non-comedogenic moisturizer. The patient verbalized understanding of the proper use and possible adverse effects of retinoids.  All of the patient's questions and concerns were addressed.
Topical Clindamycin Pregnancy And Lactation Text: This medication is Pregnancy Category B and is considered safe during pregnancy. It is unknown if it is excreted in breast milk.
Isotretinoin Pregnancy And Lactation Text: This medication is Pregnancy Category X and is considered extremely dangerous during pregnancy. It is unknown if it is excreted in breast milk.
Azithromycin Counseling:  I discussed with the patient the risks of azithromycin including but not limited to GI upset, allergic reaction, drug rash, diarrhea, and yeast infections.

## 2022-06-20 NOTE — PROCEDURE: TREATMENT REGIMEN
Hide Panoxyl Products: No
Detail Level: Zone
Action 4: Continue
Initiate Regimen: Low glycemic index diet (Mediterranean)
Continue Regimen: Arazlo

## 2022-06-20 NOTE — HPI: SKIN LESION
Is This A New Presentation, Or A Follow-Up?: Skin Lesion
How Severe Is Your Skin Lesion?: mild
Has Your Skin Lesion Been Treated?: not been treated
Additional History: Patient states started as a mosquito bite.

## 2022-08-28 ENCOUNTER — OFFICE VISIT (OUTPATIENT)
Dept: URGENT CARE | Facility: CLINIC | Age: 22
End: 2022-08-28
Payer: COMMERCIAL

## 2022-08-28 VITALS
SYSTOLIC BLOOD PRESSURE: 98 MMHG | WEIGHT: 157 LBS | HEART RATE: 83 BPM | OXYGEN SATURATION: 100 % | TEMPERATURE: 98.4 F | DIASTOLIC BLOOD PRESSURE: 82 MMHG | BODY MASS INDEX: 23.79 KG/M2 | HEIGHT: 68 IN | RESPIRATION RATE: 18 BRPM

## 2022-08-28 DIAGNOSIS — R21 RASH OF FOOT: ICD-10-CM

## 2022-08-28 PROCEDURE — 99213 OFFICE O/P EST LOW 20 MIN: CPT | Performed by: NURSE PRACTITIONER

## 2022-08-28 RX ORDER — KETOCONAZOLE 20 MG/G
1 CREAM TOPICAL DAILY
Qty: 30 G | Refills: 0 | Status: SHIPPED | OUTPATIENT
Start: 2022-08-28 | End: 2022-09-11

## 2022-08-28 RX ORDER — KETOCONAZOLE 20 MG/G
1 CREAM TOPICAL DAILY
Qty: 60 G | Refills: 0 | Status: SHIPPED | OUTPATIENT
Start: 2022-08-28 | End: 2022-08-28

## 2022-08-28 ASSESSMENT — ENCOUNTER SYMPTOMS
FEVER: 0
CONSTITUTIONAL NEGATIVE: 1

## 2022-08-28 ASSESSMENT — FIBROSIS 4 INDEX: FIB4 SCORE: 0.37

## 2022-08-28 ASSESSMENT — VISUAL ACUITY: OU: 1

## 2022-08-28 NOTE — PROGRESS NOTES
"Subjective:     Avani Hutchinson is a 22 y.o. female who presents for Foot Pain (X2 days, bottom of feet skin peeling off )       Foot Problem  This is a new problem. The problem has been gradually worsening. Associated symptoms include a rash. Pertinent negatives include no fever.     About 2 days ago, patient started to notice more redness, peeling at the balls of her feet, worse on the left.  Went on a hike yesterday and symptoms worsened afterwards.  No itchiness or pain.  Denies fever.  History of ringworm.    Review of Systems   Constitutional: Negative.  Negative for fever.   Skin:  Positive for rash.   All other systems reviewed and are negative.    Refer to HPI for additional details.    During this visit, appropriate PPE was worn, hand hygiene was performed, and the patient and any visitors were masked.    PMH:  has a past medical history of COVID-19, Migraine (11/2017), Neck pain (9/24/2018), and Sinusitis (09/2018).    MEDS:   Current Outpatient Medications:     ketoconazole (NIZORAL) 2 % Cream, Apply 1 Application topically every day for 14 days., Disp: 30 g, Rfl: 0    clotrimazole (LOTRIMIN) 1 % Cream, Apply thin layer to affected skin area BID for 2 weeks., Disp: 28 g, Rfl: 1    Etonogestrel (NEXPLANON SC), Inject 1 Each under the skin see administration instructions. Every 3 years, Disp: , Rfl:     ALLERGIES:   Allergies   Allergen Reactions    Naproxen Diarrhea     N/V/D     SURGHX: History reviewed. No pertinent surgical history.  SOCHX:  reports that she has never smoked. She has never used smokeless tobacco. She reports current alcohol use of about 2.4 oz per week. She reports that she does not use drugs.    FH: Per HPI as applicable/pertinent.      Objective:     BP (!) 98/82   Pulse 83   Temp 36.9 °C (98.4 °F) (Temporal)   Resp 18   Ht 1.727 m (5' 8\")   Wt 71.2 kg (157 lb)   SpO2 100%   BMI 23.87 kg/m²     Physical Exam  Nursing note reviewed.   Constitutional:       General: She is not " From: Adalberto Esquivel  To: Zoe Noe MD  Sent: 11/13/2019 6:01 PM CST  Subject: Medication Question    I need another script for my Mydayis please and thank you. [facility] [place].   Thank you,   Adalberto Meyer   in acute distress.     Appearance: She is well-developed. She is not ill-appearing or toxic-appearing.   Eyes:      General: Vision grossly intact.   Cardiovascular:      Rate and Rhythm: Normal rate.   Pulmonary:      Effort: Pulmonary effort is normal. No respiratory distress.   Musculoskeletal:         General: No deformity. Normal range of motion.   Skin:     General: Skin is warm and dry.      Coloration: Skin is not pale.      Findings: Rash (Mild erythema, peeling of skin at balls of feet, dry scaly texture, worse on left) present.   Neurological:      Mental Status: She is alert and oriented to person, place, and time.      Motor: No weakness.   Psychiatric:         Behavior: Behavior normal. Behavior is cooperative.       Assessment/Plan:     1. Rash of foot  - ketoconazole (NIZORAL) 2 % Cream; Apply 1 Application topically every day for 14 days.  Dispense: 30 g; Refill: 0    Rx as above sent electronically. Keep feet clean and dry. Consider washing footwear. OTC Cortizone 10 cream PRN.    Differential diagnosis, natural history, supportive care, over-the-counter symptom management per 's instructions, close monitoring, and indications for immediate follow-up discussed.     All questions answered. Patient agrees with the plan of care.    Discharge summary provided via [facility].

## 2022-10-14 NOTE — HPI: PIMPLES (ACNE)
How Severe Is Your Acne?: mild
Is This A New Presentation, Or A Follow-Up?: Follow Up Acne
Females Only: When Was Your Last Menstrual Period?: 10/22/18
Dutasteride Counseling: Dustasteride Counseling:  I discussed with the patient the risks of use of dutasteride including but not limited to decreased libido, decreased ejaculate volume, and gynecomastia. Women who can become pregnant should not handle medication.  All of the patient's questions and concerns were addressed.
Dutasteride Male Counseling: Dustasteride Counseling:  I discussed with the patient the risks of use of dutasteride including but not limited to decreased libido, decreased ejaculate volume, and gynecomastia. Women who can become pregnant should not handle medication.  All of the patient's questions and concerns were addressed.

## 2023-01-06 ENCOUNTER — HOSPITAL ENCOUNTER (OUTPATIENT)
Dept: RADIOLOGY | Facility: MEDICAL CENTER | Age: 23
End: 2023-01-06
Attending: NURSE PRACTITIONER
Payer: COMMERCIAL

## 2023-01-06 ENCOUNTER — HOSPITAL ENCOUNTER (OUTPATIENT)
Facility: MEDICAL CENTER | Age: 23
End: 2023-01-06
Attending: NURSE PRACTITIONER
Payer: COMMERCIAL

## 2023-01-06 ENCOUNTER — OFFICE VISIT (OUTPATIENT)
Dept: URGENT CARE | Facility: CLINIC | Age: 23
End: 2023-01-06
Payer: COMMERCIAL

## 2023-01-06 ENCOUNTER — TELEPHONE (OUTPATIENT)
Dept: URGENT CARE | Facility: CLINIC | Age: 23
End: 2023-01-06

## 2023-01-06 VITALS
SYSTOLIC BLOOD PRESSURE: 116 MMHG | RESPIRATION RATE: 18 BRPM | TEMPERATURE: 98.6 F | BODY MASS INDEX: 22.73 KG/M2 | WEIGHT: 150 LBS | HEIGHT: 68 IN | OXYGEN SATURATION: 98 % | DIASTOLIC BLOOD PRESSURE: 78 MMHG | HEART RATE: 70 BPM

## 2023-01-06 DIAGNOSIS — R10.30 LOWER ABDOMINAL PAIN: ICD-10-CM

## 2023-01-06 DIAGNOSIS — N83.201 CYST OF RIGHT OVARY: ICD-10-CM

## 2023-01-06 DIAGNOSIS — R31.9 HEMATURIA, UNSPECIFIED TYPE: ICD-10-CM

## 2023-01-06 DIAGNOSIS — R30.0 DYSURIA: ICD-10-CM

## 2023-01-06 DIAGNOSIS — N28.1 RENAL CYST, LEFT: ICD-10-CM

## 2023-01-06 DIAGNOSIS — N76.0 BACTERIAL VAGINOSIS: ICD-10-CM

## 2023-01-06 DIAGNOSIS — B96.89 BACTERIAL VAGINOSIS: ICD-10-CM

## 2023-01-06 LAB
APPEARANCE UR: NORMAL
BILIRUB UR STRIP-MCNC: NEGATIVE MG/DL
COLOR UR AUTO: NORMAL
GLUCOSE UR STRIP.AUTO-MCNC: NEGATIVE MG/DL
INT CON NEG: NORMAL
INT CON POS: NORMAL
KETONES UR STRIP.AUTO-MCNC: NEGATIVE MG/DL
LEUKOCYTE ESTERASE UR QL STRIP.AUTO: NEGATIVE
NITRITE UR QL STRIP.AUTO: NEGATIVE
PH UR STRIP.AUTO: 7.5 [PH] (ref 5–8)
POC URINE PREGNANCY TEST: NEGATIVE
PROT UR QL STRIP: 300 MG/DL
RBC UR QL AUTO: NORMAL
SP GR UR STRIP.AUTO: 1.02
UROBILINOGEN UR STRIP-MCNC: 0.2 MG/DL

## 2023-01-06 PROCEDURE — 87086 URINE CULTURE/COLONY COUNT: CPT

## 2023-01-06 PROCEDURE — 700117 HCHG RX CONTRAST REV CODE 255: Performed by: NURSE PRACTITIONER

## 2023-01-06 PROCEDURE — 87491 CHLMYD TRACH DNA AMP PROBE: CPT

## 2023-01-06 PROCEDURE — 99214 OFFICE O/P EST MOD 30 MIN: CPT | Performed by: NURSE PRACTITIONER

## 2023-01-06 PROCEDURE — 81002 URINALYSIS NONAUTO W/O SCOPE: CPT | Performed by: NURSE PRACTITIONER

## 2023-01-06 PROCEDURE — 74177 CT ABD & PELVIS W/CONTRAST: CPT

## 2023-01-06 PROCEDURE — 87510 GARDNER VAG DNA DIR PROBE: CPT

## 2023-01-06 PROCEDURE — 87660 TRICHOMONAS VAGIN DIR PROBE: CPT

## 2023-01-06 PROCEDURE — 87591 N.GONORRHOEAE DNA AMP PROB: CPT

## 2023-01-06 PROCEDURE — 87480 CANDIDA DNA DIR PROBE: CPT

## 2023-01-06 PROCEDURE — 81025 URINE PREGNANCY TEST: CPT | Performed by: NURSE PRACTITIONER

## 2023-01-06 RX ORDER — NITROFURANTOIN 25; 75 MG/1; MG/1
100 CAPSULE ORAL 2 TIMES DAILY
Qty: 10 CAPSULE | Refills: 0 | Status: SHIPPED | OUTPATIENT
Start: 2023-01-06 | End: 2023-01-12

## 2023-01-06 RX ORDER — IBUPROFEN 600 MG/1
600 TABLET ORAL EVERY 6 HOURS PRN
Qty: 30 TABLET | Refills: 0 | Status: SHIPPED | OUTPATIENT
Start: 2023-01-06 | End: 2023-10-16

## 2023-01-06 RX ADMIN — IOHEXOL 100 ML: 350 INJECTION, SOLUTION INTRAVENOUS at 18:00

## 2023-01-06 ASSESSMENT — ENCOUNTER SYMPTOMS
FEVER: 0
ABDOMINAL PAIN: 1
NAUSEA: 0
CONSTIPATION: 0
VOMITING: 0
DIARRHEA: 0
FLANK PAIN: 0

## 2023-01-06 ASSESSMENT — FIBROSIS 4 INDEX: FIB4 SCORE: 0.37

## 2023-01-06 NOTE — PROGRESS NOTES
Subjective:     Avani Hutchinson is a 22 y.o. female who presents for UTI (Today, pelvic pain, blood in urine, frequency.)      Presents for UTI symptoms with hematuria. States the abdominal pain and urgency woke her up.  Dull lower mid abdominal pain, has been constant. Denies vaginal bleeding. No significant change in vaginal discharge, stating the consistency has been the same, only increased some in the amount. Denies vaginal odor. LMP 3 weeks ago. Hx of irregular menstrual cycles with IUD. Is sexually active. Does not believes she's at risk for an STI.     UTI  This is a new problem. The current episode started today. Associated symptoms include abdominal pain and urinary symptoms. Pertinent negatives include no fever, nausea or vomiting.     Past Medical History:   Diagnosis Date    COVID-19     Migraine 11/2017    Neck pain 9/24/2018    Sinusitis 09/2018       History reviewed. No pertinent surgical history.    Social History     Socioeconomic History    Marital status: Single     Spouse name: Not on file    Number of children: Not on file    Years of education: Not on file    Highest education level: Not on file   Occupational History    Occupation: Student     Comment: [employer]   Tobacco Use    Smoking status: Never    Smokeless tobacco: Never   Vaping Use    Vaping Use: Never used   Substance and Sexual Activity    Alcohol use: Yes     Alcohol/week: 2.4 oz     Types: 4 Glasses of wine per week     Comment: varies     Drug use: No    Sexual activity: Yes     Partners: Male   Other Topics Concern    Behavioral problems Not Asked    Interpersonal relationships Not Asked    Sad or not enjoying activities Not Asked    Suicidal thoughts Not Asked    Poor school performance Not Asked    Reading difficulties Not Asked    Speech difficulties Not Asked    Writing difficulties Not Asked    Inadequate sleep Not Asked    Excessive TV viewing Not Asked    Excessive video game use Not Asked    Inadequate  "exercise Not Asked    Sports related Not Asked    Poor diet Not Asked    Family concerns for drug/alcohol abuse Not Asked    Poor oral hygiene Not Asked    Bike safety Not Asked    Family concerns vehicle safety Not Asked   Social History Narrative    Senior in high school.     Parents - 50/50 split time.      Social Determinants of Health     Financial Resource Strain: Not on file   Food Insecurity: Not on file   Transportation Needs: Not on file   Physical Activity: Not on file   Stress: Not on file   Social Connections: Not on file   Intimate Partner Violence: Not on file   Housing Stability: Not on file        Family History   Problem Relation Age of Onset    Cancer Mother         melanoma    Cancer Maternal Grandmother         NHL, uterine cancer    Arrythmia Maternal Grandfather     Thyroid Maternal Grandfather     Hypertension Maternal Grandfather     Cancer Paternal Grandmother         melanoma    Anxiety disorder Paternal Grandfather         Allergies   Allergen Reactions    Naproxen Diarrhea     N/V/D       Review of Systems   Constitutional:  Negative for fever.   Gastrointestinal:  Positive for abdominal pain. Negative for constipation, diarrhea, nausea and vomiting.   Genitourinary:  Positive for dysuria, frequency, hematuria and urgency. Negative for flank pain.   All other systems reviewed and are negative.     Objective:   /78   Pulse 70   Temp 37 °C (98.6 °F) (Temporal)   Resp 18   Ht 1.727 m (5' 8\")   Wt 68 kg (150 lb)   SpO2 98%   BMI 22.81 kg/m²     Physical Exam  Vitals reviewed.   Constitutional:       General: She is not in acute distress.     Appearance: She is well-developed.   HENT:      Head: Normocephalic and atraumatic.      Right Ear: External ear normal.      Left Ear: External ear normal.   Eyes:      Conjunctiva/sclera: Conjunctivae normal.   Cardiovascular:      Rate and Rhythm: Normal rate.   Pulmonary:      Effort: Pulmonary effort is normal. No respiratory " distress.   Abdominal:      General: Bowel sounds are normal. There is no distension.      Palpations: Abdomen is soft.      Tenderness: There is abdominal tenderness in the suprapubic area. There is no right CVA tenderness, left CVA tenderness or rebound.   Skin:     General: Skin is warm and dry.      Coloration: Skin is not pale.      Findings: No rash.   Neurological:      Mental Status: She is alert and oriented to person, place, and time.      GCS: GCS eye subscore is 4. GCS verbal subscore is 5. GCS motor subscore is 6.   Psychiatric:         Speech: Speech normal.         Behavior: Behavior normal.         Thought Content: Thought content normal.         Judgment: Judgment normal.       Assessment/Plan:   1. Cyst of right ovary  - CT-ABDOMEN-PELVIS WITH; Future  - ibuprofen (MOTRIN) 600 MG Tab; Take 1 Tablet by mouth every 6 hours as needed for Inflammation or Moderate Pain.  Dispense: 30 Tablet; Refill: 0    2. Lower abdominal pain  - POCT Urinalysis  - VAGINAL PATHOGENS DNA PANEL; Future  - Chlamydia/GC, PCR (Genital/Anal swab); Future  - POCT Pregnancy  - CT-ABDOMEN-PELVIS WITH; Future  - ibuprofen (MOTRIN) 600 MG Tab; Take 1 Tablet by mouth every 6 hours as needed for Inflammation or Moderate Pain.  Dispense: 30 Tablet; Refill: 0    3. Dysuria  - POCT Urinalysis  - URINE CULTURE(NEW); Future  - nitrofurantoin (MACROBID) 100 MG Cap; Take 1 Capsule by mouth 2 times a day for 5 days.  Dispense: 10 Capsule; Refill: 0    4. Hematuria, unspecified type  - POCT Urinalysis  - URINE CULTURE(NEW); Future  - CT-ABDOMEN-PELVIS WITH; Future  - nitrofurantoin (MACROBID) 100 MG Cap; Take 1 Capsule by mouth 2 times a day for 5 days.  Dispense: 10 Capsule; Refill: 0    5. Bacterial vaginosis  - VAGINAL PATHOGENS DNA PANEL; Future  - metroNIDAZOLE (FLAGYL) 500 MG Tab; Take 1 Tablet by mouth 2 times a day for 7 days.  Dispense: 14 Tablet; Refill: 0    Results for orders placed or performed in visit on 01/06/23   POCT  Urinalysis   Result Value Ref Range    POC Color Light Yellow Negative    POC Appearance Turbid Negative    POC Leukocyte Esterase Negative Negative    POC Nitrites Negative Negative    POC Urobiligen 0.2 Negative (0.2) mg/dL    POC Protein 300 Negative mg/dL    POC Urine PH 7.5 5.0 - 8.0    POC Blood Moderate Negative    POC Specific Gravity 1.025 <1.005 - >1.030    POC Ketones Negative Negative mg/dL    POC Bilirubin Negative Negative mg/dL    POC Glucose Negative Negative mg/dL   POCT Pregnancy   Result Value Ref Range    POC Urine Pregnancy Test Negative Negative    Internal Control Positive Valid     Internal Control Negative Valid    -Oral Hydration: Drink plenty of water.  -Follow up with PCP and/or OB/GYN.    Follow up urgently for new or persistent abdominal pain, flank pain, difficulty with urination, fevers, vomiting, weakness, tachycardia, persistent hematuria, or any other concerns.    -Stable vitals. Afebrile. No CVA tenderness to palpation. Acute hematuria. No flank pain. Discussed empiric antibiotic coverage for a UTI, with dysuria and hematuria, and f/u on the culture. Also recommend vaginal cultures and imaging with pelvic pain.    CT-ABDOMEN-PELVIS WITH    Result Date: 1/6/2023 1/6/2023 5:09 PM HISTORY/REASON FOR EXAM:  Abdominal pain, acute, nonlocalized. TECHNIQUE/EXAM DESCRIPTION:   CT scan of the abdomen and pelvis with contrast. Contrast-enhanced helical scanning was obtained from the diaphragmatic domes through the pubic symphysis following the bolus administration of nonionic contrast without complication. 100 mL of Omnipaque 350 nonionic contrast was administered without complication. Low dose optimization technique was utilized for this CT exam including automated exposure control and adjustment of the mA and/or kV according to patient size. COMPARISON: None. FINDINGS: Lower chest: Lung bases are clear. Liver: Benign right hepatic cyst. No suspicious liver lesions. No intrahepatic  biliary dilatation. Gallbladder: No mural thickening. No calcified gallstones. Common bile duct: Nondilated. Pancreas: Unremarkable. Spleen: No mass. Adrenals: No mass. Kidneys: No suspicious mass lesions. Tiny left renal cyst. It does not require imaging follow-up. No hydronephrosis. Stomach, small bowel, colon: No bowel wall thickening or obstruction. No appendicitis. Moderate amount of stool throughout the colon. Peritoneal cavity: No ascites. Lymph nodes: No enlarged nodes by size criteria. Aorta: No aneurysm. Pelvic organs: A 4 cm right ovarian cyst. Normal uterus and bladder. Musculoskeletal structures: No acute fracture or destructive lesion.     1.  No acute abnormality in the abdomen or pelvis. 2.  A 4 cm right ovarian cyst.    -F/U on imaging and ovarian cyst: Patient has a OB/GYN. Advised f/u with GYN; emergently for dizziness or severe pain.     Differential diagnosis, natural history, supportive care, and indications for immediate follow-up discussed.

## 2023-01-07 LAB
C TRACH DNA GENITAL QL NAA+PROBE: NEGATIVE
CANDIDA DNA VAG QL PROBE+SIG AMP: NEGATIVE
G VAGINALIS DNA VAG QL PROBE+SIG AMP: POSITIVE
N GONORRHOEA DNA GENITAL QL NAA+PROBE: NEGATIVE
SPECIMEN SOURCE: NORMAL
T VAGINALIS DNA VAG QL PROBE+SIG AMP: NEGATIVE

## 2023-01-07 RX ORDER — METRONIDAZOLE 500 MG/1
500 TABLET ORAL 2 TIMES DAILY
Qty: 14 TABLET | Refills: 0 | Status: SHIPPED | OUTPATIENT
Start: 2023-01-07 | End: 2023-01-14

## 2023-01-09 LAB
BACTERIA UR CULT: NORMAL
SIGNIFICANT IND 70042: NORMAL
SITE SITE: NORMAL
SOURCE SOURCE: NORMAL

## 2023-01-12 RX ORDER — OMEPRAZOLE 20 MG/1
CAPSULE, DELAYED RELEASE ORAL
COMMUNITY
Start: 2022-12-19 | End: 2023-10-16

## 2023-01-12 RX ORDER — SUCRALFATE 1 G/1
1 TABLET ORAL 3 TIMES DAILY
COMMUNITY
Start: 2022-12-19 | End: 2023-10-16

## 2023-01-12 NOTE — PATIENT INSTRUCTIONS
-Oral Hydration: Drink plenty of water.  -Follow up with PCP and/or OB/GYN.    Follow up urgently for new or persistent abdominal pain, flank pain, difficulty with urination, fevers, vomiting, weakness, tachycardia, persistent hematuria, or any other concerns.

## 2023-04-18 ENCOUNTER — OFFICE VISIT (OUTPATIENT)
Dept: URGENT CARE | Facility: CLINIC | Age: 23
End: 2023-04-18
Payer: COMMERCIAL

## 2023-04-18 VITALS
SYSTOLIC BLOOD PRESSURE: 112 MMHG | HEIGHT: 68 IN | TEMPERATURE: 100.1 F | BODY MASS INDEX: 22.73 KG/M2 | WEIGHT: 150 LBS | DIASTOLIC BLOOD PRESSURE: 60 MMHG | RESPIRATION RATE: 20 BRPM | OXYGEN SATURATION: 96 % | HEART RATE: 118 BPM

## 2023-04-18 DIAGNOSIS — R50.9 FEVER, UNSPECIFIED FEVER CAUSE: ICD-10-CM

## 2023-04-18 DIAGNOSIS — J02.9 SORE THROAT: ICD-10-CM

## 2023-04-18 DIAGNOSIS — J02.0 STREP PHARYNGITIS: ICD-10-CM

## 2023-04-18 LAB
FLUAV RNA SPEC QL NAA+PROBE: NEGATIVE
FLUBV RNA SPEC QL NAA+PROBE: NEGATIVE
RSV RNA SPEC QL NAA+PROBE: NEGATIVE
S PYO DNA SPEC NAA+PROBE: DETECTED
SARS-COV-2 RNA RESP QL NAA+PROBE: NEGATIVE

## 2023-04-18 PROCEDURE — 0241U POCT CEPHEID COV-2, FLU A/B, RSV - PCR: CPT | Performed by: PHYSICIAN ASSISTANT

## 2023-04-18 PROCEDURE — 87651 STREP A DNA AMP PROBE: CPT | Performed by: PHYSICIAN ASSISTANT

## 2023-04-18 PROCEDURE — 99214 OFFICE O/P EST MOD 30 MIN: CPT | Performed by: PHYSICIAN ASSISTANT

## 2023-04-18 RX ORDER — ACETAMINOPHEN 500 MG
1000 TABLET ORAL EVERY 6 HOURS PRN
COMMUNITY
End: 2023-10-16

## 2023-04-18 RX ORDER — AMOXICILLIN 500 MG/1
500 CAPSULE ORAL 2 TIMES DAILY
Qty: 20 CAPSULE | Refills: 0 | Status: SHIPPED | OUTPATIENT
Start: 2023-04-18 | End: 2023-04-28

## 2023-04-18 ASSESSMENT — ENCOUNTER SYMPTOMS
SORE THROAT: 1
EYE DISCHARGE: 0
FEVER: 1
HEADACHES: 1
CHILLS: 1
VOMITING: 0
COUGH: 0
DIARRHEA: 0
MYALGIAS: 1
NAUSEA: 0
EYE REDNESS: 0

## 2023-04-18 ASSESSMENT — FIBROSIS 4 INDEX: FIB4 SCORE: 0.39

## 2023-04-18 NOTE — LETTER
April 18, 2023         Patient: Avani Hutchinson   YOB: 2000   Date of Visit: 4/18/2023           To Whom it May Concern:    Avani Hutchinson was seen in my clinic on 4/18/2023. Please excuse her from school 4/18, 4/19, and 4/20. She may return to school on 4/21/2023.    If you have any questions or concerns, please don't hesitate to call.        Sincerely,           Irma Fonseca P.A.-C.  Electronically Signed

## 2023-04-19 NOTE — PROGRESS NOTES
Subjective     Avani Hutchinson is a 23 y.o. female who presents with Fever (103-103.3 without Tylenol, had chills x 1 day, tired, not able to sleep well, sore throat, muscle pain: snow boarding, loss of appetite, mild headache)        URI   This is a new problem. Episode onset: x last night. The problem has been unchanged. The maximum temperature recorded prior to her arrival was 103 - 104 F. Associated symptoms include headaches and a sore throat. Pertinent negatives include no congestion, coughing, diarrhea, ear pain, nausea or vomiting. She has tried acetaminophen for the symptoms.     The patient reports no recent sick contacts but, states she recently traveled to [place] on a ski trip.    PMH:  has a past medical history of COVID-19, Migraine (11/2017), Neck pain (9/24/2018), and Sinusitis (09/2018).  MEDS:   Current Outpatient Medications:     acetaminophen (TYLENOL) 500 MG Tab, Take 1,000 mg by mouth every 6 hours as needed., Disp: , Rfl:     Etonogestrel (NEXPLANON SC), Inject 1 Each under the skin see administration instructions. Every 3 years, Disp: , Rfl:     sucralfate (CARAFATE) 1 GM Tab, Take 1 g by mouth 3 times a day. TAKE 1 TABLET BY MOUTH 3 TIMES A DAY FOR 5 DAYS, Disp: , Rfl:     omeprazole (PRILOSEC) 20 MG delayed-release capsule, TAKE 1 CAPSULE BY MOUTH TWICE A DAY FOR 2 WEEKS, Disp: , Rfl:     Tazarotene 0.045 % Lotion, , Disp: , Rfl:     ibuprofen (MOTRIN) 600 MG Tab, Take 1 Tablet by mouth every 6 hours as needed for Inflammation or Moderate Pain., Disp: 30 Tablet, Rfl: 0  ALLERGIES:   Allergies   Allergen Reactions    Naproxen Diarrhea     N/V/D     SURGHX: History reviewed. No pertinent surgical history.  SOCHX:  reports that she has never smoked. She has never used smokeless tobacco. She reports that she does not currently use alcohol after a past usage of about 2.4 oz per week. She reports that she does not use drugs.  FH: Family history was reviewed, no pertinent findings to  "report      Review of Systems   Constitutional:  Positive for chills, fever and malaise/fatigue.   HENT:  Positive for sore throat. Negative for congestion and ear pain.    Eyes:  Negative for discharge and redness.   Respiratory:  Negative for cough.    Gastrointestinal:  Negative for diarrhea, nausea and vomiting.   Musculoskeletal:  Positive for myalgias.   Neurological:  Positive for headaches.            Objective     /60 (BP Location: Left arm, Patient Position: Sitting, BP Cuff Size: Adult)   Pulse (!) 118   Temp 37.8 °C (100.1 °F) (Oral)   Resp 20   Ht 1.727 m (5' 8\")   Wt 68 kg (150 lb)   SpO2 96%   BMI 22.81 kg/m²      Physical Exam  Constitutional:       General: She is not in acute distress.     Appearance: Normal appearance. She is not ill-appearing.   HENT:      Head: Normocephalic and atraumatic.      Right Ear: Tympanic membrane, ear canal and external ear normal.      Left Ear: Tympanic membrane, ear canal and external ear normal.      Nose: Nose normal.      Mouth/Throat:      Mouth: Mucous membranes are moist.      Pharynx: Oropharynx is clear. Uvula midline. Posterior oropharyngeal erythema present.      Tonsils: No tonsillar exudate.   Eyes:      Extraocular Movements: Extraocular movements intact.      Conjunctiva/sclera: Conjunctivae normal.   Cardiovascular:      Rate and Rhythm: Regular rhythm. Tachycardia present.      Heart sounds: Normal heart sounds.   Pulmonary:      Effort: Pulmonary effort is normal. No respiratory distress.      Breath sounds: Normal breath sounds. No wheezing.   Musculoskeletal:         General: Normal range of motion.      Cervical back: Normal range of motion and neck supple.   Skin:     General: Skin is warm and dry.   Neurological:      Mental Status: She is alert and oriented to person, place, and time.             Progress:  Results for orders placed or performed in visit on 04/18/23   POCT GROUP A STREP, PCR   Result Value Ref Range    POC Group " A Strep, PCR Detected (A) Not Detected, Invalid   POCT CEPHEID COV-2, FLU A/B, RSV - PCR   Result Value Ref Range    SARS-CoV-2 by PCR Negative Negative, Invalid    Influenza virus A RNA Negative Negative, Invalid    Influenza virus B, PCR Negative Negative, Invalid    RSV, PCR Negative Negative, Invalid                   Assessment & Plan      1. Sore throat    2. Fever, unspecified fever cause  - POCT GROUP A STREP, PCR  - POCT CEPHEID COV-2, FLU A/B, RSV - PCR    3. Strep pharyngitis  - amoxicillin (AMOXIL) 500 MG Cap; Take 1 Capsule by mouth 2 times a day for 10 days.  Dispense: 20 Capsule; Refill: 0    Differential diagnoses, supportive care, and indications for immediate follow-up discussed with patient.   Instructed to return to clinic or nearest emergency department for any change in condition, further concerns, or worsening of symptoms.    OTC Tylenol or Motrin for fever/discomfort.  OTC cough/cold medication for symptomatic relief  OTC Supportive Care for Congestion - saline nasal spray or neti pot  OTC Supportive Care for Sore Throat - warm salt water gargles, sore throat lozenges, warm lemon water, and/or tea.  Drink plenty of fluids  Follow-up with PCP  School note provided   Return to clinic or go to the ED if symptoms worsen or fail to improve, or if the patient should develop worsening/increasing cough, congestion, ear pain, sore throat, shortness of breath, wheezing, chest pain, fever/chills, and/or any concerning symptoms.    Discussed plan with the patient, and she agrees to the above.     I personally reviewed prior external notes and test results pertinent to today's visit.  I have independently reviewed and interpreted all diagnostics ordered during this urgent care visit.     Please note that this dictation was created using voice recognition software. I have made every reasonable attempt to correct obvious errors, but I expect that there may be errors of grammar and possibly content that I  did not discover before finalizing the note.     This note was electronically signed by Irma Fonseca PA-C

## 2023-04-28 ENCOUNTER — OFFICE VISIT (OUTPATIENT)
Dept: MEDICAL GROUP | Facility: IMAGING CENTER | Age: 23
End: 2023-04-28
Payer: COMMERCIAL

## 2023-04-28 VITALS
OXYGEN SATURATION: 98 % | HEIGHT: 68 IN | WEIGHT: 153.6 LBS | RESPIRATION RATE: 17 BRPM | HEART RATE: 69 BPM | TEMPERATURE: 98.4 F | BODY MASS INDEX: 23.28 KG/M2 | DIASTOLIC BLOOD PRESSURE: 70 MMHG | SYSTOLIC BLOOD PRESSURE: 102 MMHG

## 2023-04-28 DIAGNOSIS — Z00.00 WELL ADULT EXAM: ICD-10-CM

## 2023-04-28 DIAGNOSIS — Z13.220 SCREENING, LIPID: ICD-10-CM

## 2023-04-28 DIAGNOSIS — E55.9 VITAMIN D INSUFFICIENCY: ICD-10-CM

## 2023-04-28 DIAGNOSIS — Z13.1 SCREENING FOR DIABETES MELLITUS: ICD-10-CM

## 2023-04-28 PROCEDURE — 99395 PREV VISIT EST AGE 18-39: CPT | Performed by: FAMILY MEDICINE

## 2023-04-28 ASSESSMENT — PATIENT HEALTH QUESTIONNAIRE - PHQ9: CLINICAL INTERPRETATION OF PHQ2 SCORE: 0

## 2023-04-28 ASSESSMENT — FIBROSIS 4 INDEX: FIB4 SCORE: 0.39

## 2023-04-28 NOTE — PROGRESS NOTES
Chief Complaint   Patient presents with    Other     Well visit        HPI:  23 y.o. female here for well exam.   Bexero vaccine completed 9/2022. Patient to plan to complete 2nd vaccine where Bexero is available.   States she completed antibiotics for strep.   Had prior GI issues. Saw UC, states she was given medication for ulcers and has improved.     Lifestyle:  Diet: healthy  Exercise/Activities: exercises regularly  Stressors: doing well, applying to medical school.   Work: student    Health Maintenance:  Last pap: due, last August- gynecology. Plan to follow up with gynecology.     Health Maintenance Due   Topic Date Due    CERVICAL CANCER SCREENING  Never done    HEPATITIS C SCREENING  Never done    IMM MENINGOCOCCAL B VACCINE HEALTHY PATIENTS AGED 16 TO 23 (2 of 2 - Bexsero 2-Dose series) 10/21/2022       Immunization History   Administered Date(s) Administered    Dtap Vaccine 2000, 2001, 2001, 05/29/2002, 03/01/2005    HPV Quadrivalent Vaccine (GARDASIL) - HISTORICAL DATA 03/14/2013, 05/15/2013, 09/17/2013    Hepatitis A Vaccine, Ped/Adol 02/25/2004, 03/01/2005    Hepatitis B Vaccine Adolescent/Pediatric 2001, 2001, 2001    Hib Vaccine (Prp-d) - HISTORICAL DATA 2001, 2001, 2001, 05/29/2002    IPV 2001, 2001, 2001, 03/01/2005    Influenza Vaccine Quad Inj (Pf) 02/01/2018, 10/15/2021, 10/20/2022    MENING VAC SERO B 2 DOSE SCHED IM (BEXSERO) 09/23/2022    MMR Vaccine 02/20/2002, 03/01/2005    Meningococcal Conjugate Vaccine MCV4 (MENVEO) 09/25/2017    Meningococcal Conjugate Vaccine MCV4 (Menactra) 03/14/2013    PFIZER BIVALENT BOOSTER SARS-COV-2 VACCINE (12+) 10/20/2022    PFIZER PURPLE CAP SARS-COV-2 VACCINATION (12+) 03/24/2021, 04/14/2021, 11/26/2021    Pneumococcal Vaccine (PCV7) - HISTORICAL DATA 2001, 2001, 2001, 02/20/2002    QUANTIFERON GOLD - HISTORICAL DATA 02/05/2018, 08/09/2021    Tdap Vaccine  03/14/2013, 08/31/2022    Varicella Vaccine Live 02/07/2018, 03/08/2018       Review of Systems   Constitutional: Negative for fever, chills and malaise/fatigue.   HENT: Negative for congestion, sore throat, or swallowing issues.   Eyes: Negative for pain or vision changes.   Respiratory: Negative for cough and shortness of breath.    Cardiovascular: Negative for leg swelling. No chest pain.   Gastrointestinal: Negative for nausea, vomiting, abdominal pain and diarrhea.   Genitourinary: Negative for dysuria and hematuria.   Skin: Negative for rash.   Neurological: Negative for dizziness, focal weakness and headaches.   Endo/Heme/Allergies: Does not bruise/bleed easily.   Psychiatric/Behavioral: Negative for depression.  The patient is not nervous/anxious.          Current Outpatient Medications:     acetaminophen (TYLENOL) 500 MG Tab, Take 1,000 mg by mouth every 6 hours as needed., Disp: , Rfl:     amoxicillin (AMOXIL) 500 MG Cap, Take 1 Capsule by mouth 2 times a day for 10 days., Disp: 20 Capsule, Rfl: 0    sucralfate (CARAFATE) 1 GM Tab, Take 1 g by mouth 3 times a day. TAKE 1 TABLET BY MOUTH 3 TIMES A DAY FOR 5 DAYS, Disp: , Rfl:     omeprazole (PRILOSEC) 20 MG delayed-release capsule, TAKE 1 CAPSULE BY MOUTH TWICE A DAY FOR 2 WEEKS, Disp: , Rfl:     Tazarotene 0.045 % Lotion, , Disp: , Rfl:     ibuprofen (MOTRIN) 600 MG Tab, Take 1 Tablet by mouth every 6 hours as needed for Inflammation or Moderate Pain., Disp: 30 Tablet, Rfl: 0    Etonogestrel (NEXPLANON SC), Inject 1 Each under the skin see administration instructions. Every 3 years, Disp: , Rfl:     Allergies   Allergen Reactions    Naproxen Diarrhea     N/V/D       Patient Active Problem List   Diagnosis    Fatigue    Scoliosis of thoracic spine    Migraine with aura and with status migrainosus, not intractable    Chronic pain of left ankle    Diarrhea    Abdominal cramping       Past Medical History:   Diagnosis Date    COVID-19     Migraine 11/2017     "Neck pain 9/24/2018    Sinusitis 09/2018       History reviewed. No pertinent surgical history.    Family History   Problem Relation Age of Onset    Cancer Mother         melanoma    Cancer Maternal Grandmother         NHL, uterine cancer    Arrythmia Maternal Grandfather     Thyroid Maternal Grandfather     Hypertension Maternal Grandfather     Cancer Paternal Grandmother         melanoma    Anxiety disorder Paternal Grandfather        Social History     Tobacco Use    Smoking status: Never    Smokeless tobacco: Never   Vaping Use    Vaping Use: Never used   Substance Use Topics    Alcohol use: Not Currently     Alcohol/week: 2.4 oz     Types: 4 Glasses of wine per week     Comment: varies/ weekends    Drug use: No         PHYSICAL EXAM:  /70 (BP Location: Left arm, Patient Position: Sitting, BP Cuff Size: Adult)   Pulse 69   Temp 36.9 °C (98.4 °F) (Temporal)   Resp 17   Ht 1.727 m (5' 8\")   Wt 69.7 kg (153 lb 9.6 oz)   LMP 04/14/2023 (Approximate)   SpO2 98%   BMI 23.35 kg/m²   Constitutional: She appears well-developed and well-nourished. She appears not diaphoretic. No distress.   HENT: Right Ear: External ear normal. Left Ear: External ear normal. Tympanic membranes clear and intact.   Nose: Nose normal.   Mouth/Throat: Oropharynx is clear and moist. No oropharyngeal exudate.     Eyes: Conjunctivae and extraocular motions are normal. Pupils are equal, round, and reactive to light. No scleral icterus.   Neck: Normal range of motion. Neck supple. No thyromegaly present.   Cardiovascular: Normal rate, regular rhythm, normal heart sounds and intact distal pulses.  Exam reveals no gallop and no friction rub.  No murmur heard.   Pulmonary/Chest: Effort normal and breath sounds normal. No respiratory distress. She has no wheezes. She has no rales.   Abdominal: Soft. Bowel sounds are normal. She exhibits no distension and no mass. No tenderness. She has no rebound and no guarding.   Lymphadenopathy:  She " has no cervical adenopathy.   Neurological: She is alert. She has normal reflexes. No cranial nerve deficit. She exhibits normal muscle tone.   Skin: Skin is warm and dry. No rash noted. She is not diaphoretic. No erythema.   Psychiatric: She has a normal mood and affect. Her behavior is normal.   Musculoskeletal: She exhibits no edema. Full strength throughout. 2+ DTR throughout.         ASSESSMENT/PLAN:    This is a 23 y.o. female     1. Well adult exam   Reviewed healthcare maintenance and immunization recommendations.  She will plan to complete 2nd Bexero dose where available.   Recommend healthy diet and routine exercise.   Recommend regular use of seatbelt and home monitors. Recommend routine dental visits.        2. Vitamin D insufficiency   Recommend vitamin D3, 3110-2266 IU daily. Monitor labs.  VITAMIN D,25 HYDROXY (DEFICIENCY)      3. Screening, lipid  Lipid Profile      4. Screening for diabetes mellitus  Blood Glucose      Recommend routine gynecology exam and pap smear.   Follow up after labs as needed.   Otherwise recommend routine well exam.     This medical record contains text that has been entered with the assistance of computer voice recognition and dictation software.  Therefore, it may contain unintended errors in text, spelling, punctuation, or grammar.

## 2023-09-20 ENCOUNTER — OFFICE VISIT (OUTPATIENT)
Dept: URGENT CARE | Facility: CLINIC | Age: 23
End: 2023-09-20
Payer: COMMERCIAL

## 2023-09-20 VITALS
RESPIRATION RATE: 16 BRPM | TEMPERATURE: 98.6 F | SYSTOLIC BLOOD PRESSURE: 112 MMHG | BODY MASS INDEX: 23.49 KG/M2 | DIASTOLIC BLOOD PRESSURE: 76 MMHG | HEIGHT: 68 IN | OXYGEN SATURATION: 98 % | HEART RATE: 69 BPM | WEIGHT: 155 LBS

## 2023-09-20 DIAGNOSIS — R05.1 ACUTE COUGH: ICD-10-CM

## 2023-09-20 DIAGNOSIS — J30.2 SEASONAL ALLERGIES: ICD-10-CM

## 2023-09-20 DIAGNOSIS — J22 LRTI (LOWER RESPIRATORY TRACT INFECTION): ICD-10-CM

## 2023-09-20 PROCEDURE — 99213 OFFICE O/P EST LOW 20 MIN: CPT | Performed by: PHYSICIAN ASSISTANT

## 2023-09-20 PROCEDURE — 3078F DIAST BP <80 MM HG: CPT | Performed by: PHYSICIAN ASSISTANT

## 2023-09-20 PROCEDURE — 3074F SYST BP LT 130 MM HG: CPT | Performed by: PHYSICIAN ASSISTANT

## 2023-09-20 RX ORDER — CETIRIZINE HYDROCHLORIDE 10 MG/1
CAPSULE, LIQUID FILLED ORAL
COMMUNITY
End: 2023-09-13

## 2023-09-20 RX ORDER — BENZONATATE 100 MG/1
100 CAPSULE ORAL 3 TIMES DAILY PRN
Qty: 20 CAPSULE | Refills: 0 | Status: SHIPPED | OUTPATIENT
Start: 2023-09-20 | End: 2023-09-13

## 2023-09-20 RX ORDER — GUAIFENESIN 600 MG/1
600 TABLET, EXTENDED RELEASE ORAL EVERY 12 HOURS
Qty: 28 TABLET | Refills: 0 | Status: SHIPPED | OUTPATIENT
Start: 2023-09-20 | End: 2023-09-13

## 2023-09-20 RX ORDER — FLUTICASONE PROPIONATE 50 MCG
1 SPRAY, SUSPENSION (ML) NASAL DAILY
Qty: 16 G | Refills: 0 | Status: SHIPPED | OUTPATIENT
Start: 2023-09-20 | End: 2023-09-13

## 2023-09-20 RX ORDER — AZITHROMYCIN 250 MG/1
TABLET, FILM COATED ORAL
Qty: 6 TABLET | Refills: 0 | Status: SHIPPED | OUTPATIENT
Start: 2023-09-20 | End: 2023-09-13

## 2023-09-20 ASSESSMENT — ENCOUNTER SYMPTOMS
CHILLS: 0
SORE THROAT: 1
NAUSEA: 0
FEVER: 0
MYALGIAS: 0
SHORTNESS OF BREATH: 1
SPUTUM PRODUCTION: 1
COUGH: 1
VOMITING: 0

## 2023-09-20 NOTE — PROGRESS NOTES
"Subjective:   Avani Hutchinson is a 23 y.o. female who presents for Cough (X2weeks, productive cough, has green and yellow mucus, feels fine aside from cough, )  This is a pleasant 23-year-old female who presents with 2-week history of progressive cough, productive of green and yellow mucus.  She notes associated seasonal allergies and postnasal drainage.  She denies significant sinus pressure or purulent nasal discharge.  She denies fevers chills or constitutional symptoms.  She denies shortness of breath.  She feels her cough however is getting worse.  She does take Zyrtec for her allergies.  No COVID concerns.  No underlying respiratory illnesses.            Review of Systems   Constitutional:  Negative for chills, fever and malaise/fatigue.   HENT:  Positive for sore throat.    Respiratory:  Positive for cough, sputum production and shortness of breath.    Gastrointestinal:  Negative for nausea and vomiting.   Musculoskeletal:  Negative for myalgias.   Skin:  Negative for rash.       Medications:  acetaminophen Tabs  ibuprofen Tabs  NEXPLANON SC  omeprazole  sucralfate Tabs  Tazarotene Lotn  ZyrTEC Allergy Caps    Allergies:             Naproxen    Surgical History:       No past surgical history on file.    Past Social Hx:  Avani Hutchinson  reports that she has never smoked. She has never used smokeless tobacco. She reports current alcohol use of about 2.4 oz of alcohol per week. She reports that she does not use drugs.     Past Family Hx:   Avani Hutchinson family history includes Anxiety disorder in her paternal grandfather; Arrythmia in her maternal grandfather; Cancer in her maternal grandmother, mother, and paternal grandmother; Hypertension in her maternal grandfather; Thyroid in her maternal grandfather.       Problem list, medications, and allergies reviewed by myself today in Epic.     Objective:     /76   Pulse 69   Temp 37 °C (98.6 °F) (Temporal)   Resp 16   Ht 1.727 m (5' 8\")   Wt " 70.3 kg (155 lb)   SpO2 98%   BMI 23.57 kg/m²     Physical Exam  Vitals and nursing note reviewed.   Constitutional:       General: She is not in acute distress.     Appearance: She is well-developed. She is not ill-appearing or diaphoretic.   HENT:      Head: Normocephalic.      Right Ear: Tympanic membrane, ear canal and external ear normal.      Left Ear: Tympanic membrane, ear canal and external ear normal.      Nose: Mucosal edema and rhinorrhea present.      Mouth/Throat:      Pharynx: Posterior oropharyngeal erythema present.   Eyes:      General:         Right eye: No discharge.         Left eye: No discharge.      Conjunctiva/sclera: Conjunctivae normal.      Pupils: Pupils are equal, round, and reactive to light.   Cardiovascular:      Rate and Rhythm: Normal rate and regular rhythm.      Pulses: Normal pulses.      Heart sounds: Normal heart sounds. No murmur heard.     No friction rub.   Pulmonary:      Effort: Pulmonary effort is normal. No accessory muscle usage or respiratory distress.      Breath sounds: No stridor. No wheezing, rhonchi or rales.      Comments: Lungs are clear to auscultation bilaterally, no rhonchi rales or wheezes  Abdominal:      Palpations: Abdomen is soft.   Musculoskeletal:         General: Normal range of motion.      Cervical back: Normal range of motion.      Right lower leg: No edema.      Left lower leg: No edema.   Lymphadenopathy:      Cervical: No cervical adenopathy.   Skin:     General: Skin is warm and dry.   Neurological:      Mental Status: She is alert and oriented to person, place, and time.         Assessment/Plan:     Diagnosis and Associated Orders:     1. Acute cough  - benzonatate (TESSALON) 100 MG Cap; Take 1 Capsule by mouth 3 times a day as needed for Cough.  Dispense: 20 Capsule; Refill: 0  - guaiFENesin ER (MUCINEX) 600 MG TABLET SR 12 HR; Take 1 Tablet by mouth every 12 hours.  Dispense: 28 Tablet; Refill: 0  - azithromycin (ZITHROMAX) 250 MG Tab;  Take 2 tablets by mouth on day one. Take one tablet by mouth the remaining days until gone  Dispense: 6 Tablet; Refill: 0    2. LRTI (lower respiratory tract infection)  - azithromycin (ZITHROMAX) 250 MG Tab; Take 2 tablets by mouth on day one. Take one tablet by mouth the remaining days until gone  Dispense: 6 Tablet; Refill: 0    3. Seasonal allergies  - fluticasone (FLONASE) 50 MCG/ACT nasal spray; Administer 1 Spray into affected nostril(S) every day.  Dispense: 16 g; Refill: 0    Other orders  - Cetirizine HCl (ZYRTEC ALLERGY) 10 MG Cap        Comments/MDM:  Patient yaritza with 2-week history of progressive cough.  Her vital signs are stable and reassuring.  Her lungs are clear to auscultation bilaterally.  She does report cough becoming more productive.  Advised continued conservative measures as below.  Tessalon Perles and Mucinex.  Contingent antibiotic prescription for azithromycin provided if symptoms do not improve with conservative measures.  Do recommend Flonase in addition to Zyrtec for seasonal allergies and postnasal drainage.  Return precautions discussed.  I personally reviewed prior external notes and test results pertinent to today's visit. Supportive care, natural history, differential diagnoses, and indications for immediate follow-up discussed. Return to clinic or go to ED if symptoms worsen or persist.  Red flag symptoms discussed.  Patient/Parent/Guardian voices understanding. Follow-up with your primary care provider in 3-5 days.  All side effects of medication discussed including allergic response, GI upset, tendon injury, rash, sedation etc    Please note that this dictation was created using voice recognition software. I have made a reasonable attempt to correct obvious errors, but I expect that there are errors of grammar and possibly content that I did not discover before finalizing the note.    This note was electronically signed by Pushpa Braga PA-C

## 2023-10-12 DIAGNOSIS — J30.2 SEASONAL ALLERGIES: ICD-10-CM

## 2023-10-16 ENCOUNTER — OFFICE VISIT (OUTPATIENT)
Dept: URGENT CARE | Facility: CLINIC | Age: 23
End: 2023-10-16
Payer: COMMERCIAL

## 2023-10-16 VITALS
TEMPERATURE: 98 F | OXYGEN SATURATION: 99 % | DIASTOLIC BLOOD PRESSURE: 64 MMHG | HEIGHT: 68 IN | HEART RATE: 68 BPM | SYSTOLIC BLOOD PRESSURE: 112 MMHG | RESPIRATION RATE: 14 BRPM | BODY MASS INDEX: 23.49 KG/M2 | WEIGHT: 155 LBS

## 2023-10-16 DIAGNOSIS — J02.9 SORE THROAT: ICD-10-CM

## 2023-10-16 DIAGNOSIS — B96.89 BACTERIAL SINUSITIS: ICD-10-CM

## 2023-10-16 DIAGNOSIS — J32.9 BACTERIAL SINUSITIS: ICD-10-CM

## 2023-10-16 LAB — S PYO DNA SPEC NAA+PROBE: NOT DETECTED

## 2023-10-16 PROCEDURE — 3078F DIAST BP <80 MM HG: CPT | Performed by: FAMILY MEDICINE

## 2023-10-16 PROCEDURE — 87651 STREP A DNA AMP PROBE: CPT | Performed by: FAMILY MEDICINE

## 2023-10-16 PROCEDURE — 99213 OFFICE O/P EST LOW 20 MIN: CPT | Performed by: FAMILY MEDICINE

## 2023-10-16 PROCEDURE — 3074F SYST BP LT 130 MM HG: CPT | Performed by: FAMILY MEDICINE

## 2023-10-16 RX ORDER — AMOXICILLIN AND CLAVULANATE POTASSIUM 875; 125 MG/1; MG/1
1 TABLET, FILM COATED ORAL 2 TIMES DAILY
Qty: 10 TABLET | Refills: 0 | Status: SHIPPED | OUTPATIENT
Start: 2023-10-16 | End: 2023-10-21

## 2023-10-16 NOTE — LETTER
October 16, 2023    To Whom It May Concern:         This is confirmation that Avani Hutchinson attended her scheduled appointment with Cynthia Mae M.D. on 10/16/23. She may return to work tomorrow without any restrictions.          If you have any questions please do not hesitate to call me at the phone number listed below.    Sincerely,          Cynthia Mae M.D.  [phone]

## 2023-10-16 NOTE — PROGRESS NOTES
"  Subjective:      23 y.o. female presents to urgent care for cold symptoms that started about 1 week ago. She is experiencing sore throat and headache.  No fever, cough, or body ache. No tobacco product use.  No history of asthma or COPD.  She is vaccinated against COVID.  No known sick contacts.    She denies any other questions or concerns at this time.    Current problem list, medication, and past medical/surgical history were reviewed in Epic.    ROS  See HPI     Objective:      /64   Pulse 68   Temp 36.7 °C (98 °F) (Temporal)   Resp 14   Ht 1.727 m (5' 8\")   Wt 70.3 kg (155 lb)   SpO2 99%   BMI 23.57 kg/m²     Physical Exam  Constitutional:       General: She is not in acute distress.     Appearance: She is not diaphoretic.   HENT:      Right Ear: Tympanic membrane, ear canal and external ear normal.      Left Ear: Tympanic membrane, ear canal and external ear normal.      Nose:      Right Sinus: Frontal sinus tenderness present. No maxillary sinus tenderness.      Left Sinus: Frontal sinus tenderness present. No maxillary sinus tenderness.      Mouth/Throat:      Tongue: Tongue does not deviate from midline.      Palate: No lesions.      Pharynx: Uvula midline. Posterior oropharyngeal erythema present.      Tonsils: No tonsillar exudate. 1+ on the right. 1+ on the left.   Cardiovascular:      Rate and Rhythm: Normal rate and regular rhythm.      Heart sounds: Normal heart sounds.   Pulmonary:      Effort: Pulmonary effort is normal. No respiratory distress.      Breath sounds: Normal breath sounds.   Neurological:      Mental Status: She is alert.   Psychiatric:         Mood and Affect: Affect normal.         Judgment: Judgment normal.       Assessment/Plan:     1. Bacterial sinusitis  2. Sore throat  Rapid strep negative.  Symptoms most consistent with sore throat secondary to postnasal drip from bacterial sinusitis.  Prescription for Augmentin has been sent.  Tylenol and ibuprofen as needed.  - " POCT CEPHEID GROUP A STREP - PCR  - amoxicillin-clavulanate (AUGMENTIN) 875-125 MG Tab; Take 1 Tablet by mouth 2 times a day for 5 days.  Dispense: 10 Tablet; Refill: 0      Instructed to return to Urgent Care or nearest Emergency Department if symptoms fail to improve, for any change in condition, further concerns, or new concerning symptoms. Patient states understanding of the plan of care and discharge instructions.    Cynthia Mae M.D.

## 2023-10-25 ENCOUNTER — APPOINTMENT (RX ONLY)
Dept: URBAN - METROPOLITAN AREA CLINIC 35 | Facility: CLINIC | Age: 23
Setting detail: DERMATOLOGY
End: 2023-10-25

## 2023-10-25 DIAGNOSIS — L70.0 ACNE VULGARIS: ICD-10-CM | Status: INADEQUATELY CONTROLLED

## 2023-10-25 PROCEDURE — 81025 URINE PREGNANCY TEST: CPT

## 2023-10-25 PROCEDURE — ? ADDITIONAL NOTES

## 2023-10-25 PROCEDURE — ? ORDER TESTS

## 2023-10-25 PROCEDURE — ? URINE PREGNANCY TEST

## 2023-10-25 PROCEDURE — ? ISOTRETINOIN INITIATION

## 2023-10-25 PROCEDURE — ? TREATMENT REGIMEN

## 2023-10-25 PROCEDURE — 99214 OFFICE O/P EST MOD 30 MIN: CPT

## 2023-10-25 PROCEDURE — ? COUNSELING

## 2023-10-25 ASSESSMENT — LOCATION ZONE DERM: LOCATION ZONE: FACE

## 2023-10-25 ASSESSMENT — LOCATION SIMPLE DESCRIPTION DERM
LOCATION SIMPLE: LEFT CHEEK
LOCATION SIMPLE: RIGHT CHEEK

## 2023-10-25 ASSESSMENT — LOCATION DETAILED DESCRIPTION DERM
LOCATION DETAILED: LEFT CENTRAL MALAR CHEEK
LOCATION DETAILED: RIGHT INFERIOR CENTRAL MALAR CHEEK

## 2023-10-25 NOTE — PROCEDURE: ADDITIONAL NOTES
Render Risk Assessment In Note?: no
Detail Level: Simple
Additional Notes: Patient has multiple topical medications, doxycycline and minocycline without improvement

## 2023-10-25 NOTE — PROCEDURE: COUNSELING
Tetracycline Pregnancy And Lactation Text: This medication is Pregnancy Category D and not consider safe during pregnancy. It is also excreted in breast milk.
Topical Sulfur Applications Counseling: Topical Sulfur Counseling: Patient counseled that this medication may cause skin irritation or allergic reactions.  In the event of skin irritation, the patient was advised to reduce the amount of the drug applied or use it less frequently.   The patient verbalized understanding of the proper use and possible adverse effects of topical sulfur application.  All of the patient's questions and concerns were addressed.
Erythromycin Counseling:  I discussed with the patient the risks of erythromycin including but not limited to GI upset, allergic reaction, drug rash, diarrhea, increase in liver enzymes, and yeast infections.
Azithromycin Pregnancy And Lactation Text: This medication is considered safe during pregnancy and is also secreted in breast milk.
Detail Level: Simple
Dapsone Counseling: I discussed with the patient the risks of dapsone including but not limited to hemolytic anemia, agranulocytosis, rashes, methemoglobinemia, kidney failure, peripheral neuropathy, headaches, GI upset, and liver toxicity.  Patients who start dapsone require monitoring including baseline LFTs and weekly CBCs for the first month, then every month thereafter.  The patient verbalized understanding of the proper use and possible adverse effects of dapsone.  All of the patient's questions and concerns were addressed.
Topical Retinoid Pregnancy And Lactation Text: This medication is Pregnancy Category C. It is unknown if this medication is excreted in breast milk.
Birth Control Pills Pregnancy And Lactation Text: This medication should be avoided if pregnant and for the first 30 days post-partum.
High Dose Vitamin A Counseling: Side effects reviewed, pt to contact office should one occur.
Spironolactone Counseling: Patient advised regarding risks of diarrhea, abdominal pain, hyperkalemia, birth defects (for female patients), liver toxicity and renal toxicity. The patient may need blood work to monitor liver and kidney function and potassium levels while on therapy. The patient verbalized understanding of the proper use and possible adverse effects of spironolactone.  All of the patient's questions and concerns were addressed.
Tazorac Counseling:  Patient advised that medication is irritating and drying.  Patient may need to apply sparingly and wash off after an hour before eventually leaving it on overnight.  The patient verbalized understanding of the proper use and possible adverse effects of tazorac.  All of the patient's questions and concerns were addressed.
Topical Sulfur Applications Pregnancy And Lactation Text: This medication is Pregnancy Category C and has an unknown safety profile during pregnancy. It is unknown if this topical medication is excreted in breast milk.
High Dose Vitamin A Pregnancy And Lactation Text: High dose vitamin A therapy is contraindicated during pregnancy and breast feeding.
Tazorac Pregnancy And Lactation Text: This medication is not safe during pregnancy. It is unknown if this medication is excreted in breast milk.
Benzoyl Peroxide Counseling: Patient counseled that medicine may cause skin irritation and bleach clothing.  In the event of skin irritation, the patient was advised to reduce the amount of the drug applied or use it less frequently.   The patient verbalized understanding of the proper use and possible adverse effects of benzoyl peroxide.  All of the patient's questions and concerns were addressed.
Erythromycin Pregnancy And Lactation Text: This medication is Pregnancy Category B and is considered safe during pregnancy. It is also excreted in breast milk.
Bactrim Counseling:  I discussed with the patient the risks of sulfa antibiotics including but not limited to GI upset, allergic reaction, drug rash, diarrhea, dizziness, photosensitivity, and yeast infections.  Rarely, more serious reactions can occur including but not limited to aplastic anemia, agranulocytosis, methemoglobinemia, blood dyscrasias, liver or kidney failure, lung infiltrates or desquamative/blistering drug rashes.
Include Pregnancy/Lactation Warning?: No
Bactrim Pregnancy And Lactation Text: This medication is Pregnancy Category D and is known to cause fetal risk.  It is also excreted in breast milk.
Isotretinoin Counseling: Patient should get monthly blood tests, not donate blood, not drive at night if vision affected, not share medication, and not undergo elective surgery for 6 months after tx completed. Side effects reviewed, pt to contact office should one occur.
Spironolactone Pregnancy And Lactation Text: This medication can cause feminization of the male fetus and should be avoided during pregnancy. The active metabolite is also found in breast milk.
Doxycycline Counseling:  Patient counseled regarding possible photosensitivity and increased risk for sunburn.  Patient instructed to avoid sunlight, if possible.  When exposed to sunlight, patients should wear protective clothing, sunglasses, and sunscreen.  The patient was instructed to call the office immediately if the following severe adverse effects occur:  hearing changes, easy bruising/bleeding, severe headache, or vision changes.  The patient verbalized understanding of the proper use and possible adverse effects of doxycycline.  All of the patient's questions and concerns were addressed.
Dapsone Pregnancy And Lactation Text: This medication is Pregnancy Category C and is not considered safe during pregnancy or breast feeding.
Benzoyl Peroxide Pregnancy And Lactation Text: This medication is Pregnancy Category C. It is unknown if benzoyl peroxide is excreted in breast milk.
Minocycline Counseling: Patient advised regarding possible photosensitivity and discoloration of the teeth, skin, lips, tongue and gums.  Patient instructed to avoid sunlight, if possible.  When exposed to sunlight, patients should wear protective clothing, sunglasses, and sunscreen.  The patient was instructed to call the office immediately if the following severe adverse effects occur:  hearing changes, easy bruising/bleeding, severe headache, or vision changes.  The patient verbalized understanding of the proper use and possible adverse effects of minocycline.  All of the patient's questions and concerns were addressed.
Topical Clindamycin Counseling: Patient counseled that this medication may cause skin irritation or allergic reactions.  In the event of skin irritation, the patient was advised to reduce the amount of the drug applied or use it less frequently.   The patient verbalized understanding of the proper use and possible adverse effects of clindamycin.  All of the patient's questions and concerns were addressed.
Tetracycline Counseling: Patient counseled regarding possible photosensitivity and increased risk for sunburn.  Patient instructed to avoid sunlight, if possible.  When exposed to sunlight, patients should wear protective clothing, sunglasses, and sunscreen.  The patient was instructed to call the office immediately if the following severe adverse effects occur:  hearing changes, easy bruising/bleeding, severe headache, or vision changes.  The patient verbalized understanding of the proper use and possible adverse effects of tetracycline.  All of the patient's questions and concerns were addressed. Patient understands to avoid pregnancy while on therapy due to potential birth defects.
Doxycycline Pregnancy And Lactation Text: This medication is Pregnancy Category D and not consider safe during pregnancy. It is also excreted in breast milk but is considered safe for shorter treatment courses.
Birth Control Pills Counseling: Birth Control Pill Counseling: I discussed with the patient the potential side effects of OCPs including but not limited to increased risk of stroke, heart attack, thrombophlebitis, deep venous thrombosis, hepatic adenomas, breast changes, GI upset, headaches, and depression.  The patient verbalized understanding of the proper use and possible adverse effects of OCPs. All of the patient's questions and concerns were addressed.
Sarecycline Counseling: Patient advised regarding possible photosensitivity and discoloration of the teeth, skin, lips, tongue and gums.  Patient instructed to avoid sunlight, if possible.  When exposed to sunlight, patients should wear protective clothing, sunglasses, and sunscreen.  The patient was instructed to call the office immediately if the following severe adverse effects occur:  hearing changes, easy bruising/bleeding, severe headache, or vision changes.  The patient verbalized understanding of the proper use and possible adverse effects of sarecycline.  All of the patient's questions and concerns were addressed.
Topical Retinoid counseling:  Patient advised to apply a pea-sized amount only at bedtime and wait 30 minutes after washing their face before applying.  If too drying, patient may add a non-comedogenic moisturizer. The patient verbalized understanding of the proper use and possible adverse effects of retinoids.  All of the patient's questions and concerns were addressed.
Topical Clindamycin Pregnancy And Lactation Text: This medication is Pregnancy Category B and is considered safe during pregnancy. It is unknown if it is excreted in breast milk.
Isotretinoin Pregnancy And Lactation Text: This medication is Pregnancy Category X and is considered extremely dangerous during pregnancy. It is unknown if it is excreted in breast milk.
Azithromycin Counseling:  I discussed with the patient the risks of azithromycin including but not limited to GI upset, allergic reaction, drug rash, diarrhea, and yeast infections.
Azelaic Acid Counseling: Patient counseled that medicine may cause skin irritation and to avoid applying near the eyes.  In the event of skin irritation, the patient was advised to reduce the amount of the drug applied or use it less frequently.   The patient verbalized understanding of the proper use and possible adverse effects of azelaic acid.  All of the patient's questions and concerns were addressed.
Aklief counseling:  Patient advised to apply a pea-sized amount only at bedtime and wait 30 minutes after washing their face before applying.  If too drying, patient may add a non-comedogenic moisturizer.  The most commonly reported side effects including irritation, redness, scaling, dryness, stinging, burning, itching, and increased risk of sunburn.  The patient verbalized understanding of the proper use and possible adverse effects of retinoids.  All of the patient's questions and concerns were addressed.
Winlevi Pregnancy And Lactation Text: This medication is considered safe during pregnancy and breastfeeding.
Aklief Pregnancy And Lactation Text: It is unknown if this medication is safe to use during pregnancy.  It is unknown if this medication is excreted in breast milk.  Breastfeeding women should use the topical cream on the smallest area of the skin for the shortest time needed while breastfeeding.  Do not apply to nipple and areola.
Azelaic Acid Pregnancy And Lactation Text: This medication is considered safe during pregnancy and breast feeding.
Winlevi Counseling:  I discussed with the patient the risks of topical clascoterone including but not limited to erythema, scaling, itching, and stinging. Patient voiced their understanding.

## 2023-10-25 NOTE — PROCEDURE: TREATMENT REGIMEN
Hide Panoxyl Products: No
Detail Level: Zone
Action 4: Continue
Plan: Pt counseled on isotretinoin use and side effects, ipledge forms filled today \\nWill plan on starting accutane in a month after labs done and after second negative pregnancy test
Continue Regimen: Arazlo and onexton for one month until accutane is started. Samples given.

## 2023-10-25 NOTE — PROCEDURE: ORDER TESTS
Performing Laboratory: 0
Bill For Surgical Tray: no
Expected Date Of Service: 10/25/2023
Billing Type: Third-Party Bill

## 2023-10-25 NOTE — PROCEDURE: URINE PREGNANCY TEST
Expiration Date (Optional): 4/30/24
Urine Pregnancy Test Result: negative
Detail Level: None
Lot # (Optional): [id number]
Performed By: Izabella Hogue MA

## 2023-11-17 ENCOUNTER — OFFICE VISIT (OUTPATIENT)
Dept: MEDICAL GROUP | Facility: IMAGING CENTER | Age: 23
End: 2023-11-17
Payer: COMMERCIAL

## 2023-11-17 ENCOUNTER — HOSPITAL ENCOUNTER (OUTPATIENT)
Dept: LAB | Facility: MEDICAL CENTER | Age: 23
End: 2023-11-17
Attending: FAMILY MEDICINE
Payer: COMMERCIAL

## 2023-11-17 VITALS
RESPIRATION RATE: 14 BRPM | DIASTOLIC BLOOD PRESSURE: 60 MMHG | TEMPERATURE: 98.1 F | HEART RATE: 60 BPM | HEIGHT: 68 IN | BODY MASS INDEX: 24.16 KG/M2 | WEIGHT: 159.4 LBS | OXYGEN SATURATION: 97 % | SYSTOLIC BLOOD PRESSURE: 116 MMHG

## 2023-11-17 DIAGNOSIS — Z11.59 NEED FOR HEPATITIS C SCREENING TEST: ICD-10-CM

## 2023-11-17 DIAGNOSIS — Z71.85 IMMUNIZATION COUNSELING: ICD-10-CM

## 2023-11-17 DIAGNOSIS — Z11.4 SCREENING FOR HIV (HUMAN IMMUNODEFICIENCY VIRUS): ICD-10-CM

## 2023-11-17 DIAGNOSIS — Z01.84 IMMUNITY STATUS TESTING: ICD-10-CM

## 2023-11-17 LAB
HBV SURFACE AB SERPL IA-ACNC: <3.5 MIU/ML (ref 0–10)
HBV SURFACE AG SER QL: NORMAL
HCV AB SER QL: NORMAL
HIV 1+2 AB+HIV1 P24 AG SERPL QL IA: NORMAL

## 2023-11-17 PROCEDURE — 99212 OFFICE O/P EST SF 10 MIN: CPT | Performed by: FAMILY MEDICINE

## 2023-11-17 PROCEDURE — 36415 COLL VENOUS BLD VENIPUNCTURE: CPT

## 2023-11-17 PROCEDURE — 3078F DIAST BP <80 MM HG: CPT | Performed by: FAMILY MEDICINE

## 2023-11-17 PROCEDURE — 87389 HIV-1 AG W/HIV-1&-2 AB AG IA: CPT

## 2023-11-17 PROCEDURE — 3074F SYST BP LT 130 MM HG: CPT | Performed by: FAMILY MEDICINE

## 2023-11-17 PROCEDURE — 87340 HEPATITIS B SURFACE AG IA: CPT

## 2023-11-17 PROCEDURE — 86803 HEPATITIS C AB TEST: CPT

## 2023-11-17 PROCEDURE — 86706 HEP B SURFACE ANTIBODY: CPT

## 2023-11-17 RX ORDER — GAUZE BANDAGE 2" X 2"
100 BANDAGE TOPICAL DAILY
COMMUNITY

## 2023-11-17 RX ORDER — VITAMIN B COMPLEX
1000 TABLET ORAL DAILY
COMMUNITY

## 2023-11-17 NOTE — PROGRESS NOTES
"  SUBJECTIVE:    Chief Complaint   Patient presents with    Immunizations     Consult and tier for HEP B       HPI:     Avani Hutchinson is a 23 y.o. female here to review immunizations.   Worked with human tissue in lab. States she completed hepatitis B series.   1/2 marathon in future. Working on going to medical school.     ROS:  No recent fevers or chills. No nausea or vomiting. No diarrhea. No chest pains or shortness of breath. No lower extremity edema.    Current Outpatient Medications on File Prior to Visit   Medication Sig Dispense Refill    thiamine (VITAMIN B-1) 100 MG Tab Take 100 mg by mouth every day.      vitamin D3 (CHOLECALCIFEROL) 1000 Unit (25 mcg) Tab Take 1,000 Units by mouth every day.      Etonogestrel (NEXPLANON SC) Inject 1 Each under the skin see administration instructions. Every 3 years      Tazarotene 0.045 % Lotion  (Patient not taking: Reported on 11/17/2023)       No current facility-administered medications on file prior to visit.       Allergies   Allergen Reactions    Naproxen Diarrhea     N/V/D       Patient Active Problem List    Diagnosis Date Noted    Diarrhea 05/18/2021    Abdominal cramping 05/18/2021    Fatigue 09/24/2018    Scoliosis of thoracic spine 09/24/2018    Migraine with aura and with status migrainosus, not intractable 09/24/2018    Chronic pain of left ankle 09/24/2018       Past Medical History:   Diagnosis Date    COVID-19     Migraine 11/2017    Neck pain 9/24/2018    Sinusitis 09/2018         OBJECTIVE:   /60 (BP Location: Left arm, Patient Position: Sitting, BP Cuff Size: Adult)   Pulse 60   Temp 36.7 °C (98.1 °F) (Temporal)   Resp 14   Ht 1.727 m (5' 8\")   Wt 72.3 kg (159 lb 6.4 oz)   SpO2 97%   BMI 24.24 kg/m²   General: Well-developed well-nourished female, no acute distress  Skin: Warm and dry  Cardiovascular: RRR, no murmur, gallops or rubs  Pulmonary; clear to auscultation bilaterally, no wheezing, crackles or rhonchi  Psych: appropriate " mood and affect      ASSESSMENT/PLAN:    23 y.o.female       1. Immunization counseling   Reviewed recommendations.        2. Immunity status testing - notes prior immunization, will assess titers.  HEP B SURFACE ANTIGEN    HEP B SURFACE AB      3. Screening for HIV (human immunodeficiency virus)  HIV AG/AB COMBO ASSAY SCREENING      4. Need for hepatitis C screening test  HEP C VIRUS ANTIBODY      Notes having worked with human tissue in past. Assess labs as above.     Return if symptoms worsen or fail to improve.    This medical record contains text that has been entered with the assistance of computer voice recognition and dictation software.  Therefore, it may contain unintended errors in text, spelling, punctuation, or grammar.

## 2023-11-29 ENCOUNTER — APPOINTMENT (RX ONLY)
Dept: URBAN - METROPOLITAN AREA CLINIC 35 | Facility: CLINIC | Age: 23
Setting detail: DERMATOLOGY
End: 2023-11-29

## 2023-11-29 VITALS — WEIGHT: 150 LBS | HEIGHT: 68 IN

## 2023-11-29 VITALS — HEIGHT: 68 IN | WEIGHT: 150 LBS

## 2023-11-29 DIAGNOSIS — L70.0 ACNE VULGARIS: ICD-10-CM | Status: INADEQUATELY CONTROLLED

## 2023-11-29 PROCEDURE — ? COUNSELING

## 2023-11-29 PROCEDURE — ? TREATMENT REGIMEN

## 2023-11-29 PROCEDURE — ? PRESCRIPTION

## 2023-11-29 PROCEDURE — 81025 URINE PREGNANCY TEST: CPT

## 2023-11-29 PROCEDURE — 99214 OFFICE O/P EST MOD 30 MIN: CPT

## 2023-11-29 PROCEDURE — ? ISOTRETINOIN INITIATION

## 2023-11-29 PROCEDURE — ? ADDITIONAL NOTES

## 2023-11-29 PROCEDURE — ? LAB REPORTS REVIEWED

## 2023-11-29 PROCEDURE — ? URINE PREGNANCY TEST

## 2023-11-29 RX ORDER — ISOTRETINOIN 40 MG/1
1 CAPSULE ORAL QD
Qty: 30 | Refills: 0 | Status: ERX | COMMUNITY
Start: 2023-11-29

## 2023-11-29 RX ADMIN — ISOTRETINOIN 1: 40 CAPSULE ORAL at 00:00

## 2023-11-29 ASSESSMENT — LOCATION SIMPLE DESCRIPTION DERM
LOCATION SIMPLE: LEFT CHEEK
LOCATION SIMPLE: RIGHT CHEEK

## 2023-11-29 ASSESSMENT — LOCATION DETAILED DESCRIPTION DERM
LOCATION DETAILED: RIGHT INFERIOR CENTRAL MALAR CHEEK
LOCATION DETAILED: LEFT CENTRAL MALAR CHEEK

## 2023-11-29 ASSESSMENT — LOCATION ZONE DERM: LOCATION ZONE: FACE

## 2023-11-29 NOTE — PROCEDURE: TREATMENT REGIMEN
Hide Panoxyl Products: No
Detail Level: Zone
Action 4: Continue
Initiate Regimen: Absorbica 40 mg qday
Discontinue Regimen: Arazlo and onexton for one month until accutane is started. Samples given.

## 2023-11-29 NOTE — PROCEDURE: URINE PREGNANCY TEST
Expiration Date (Optional): 4/30/24
Urine Pregnancy Test Result: negative
Detail Level: None
Lot # (Optional): [id number]
Performed By: Aurora Desouza MA

## 2024-01-03 ENCOUNTER — APPOINTMENT (RX ONLY)
Dept: URBAN - METROPOLITAN AREA CLINIC 35 | Facility: CLINIC | Age: 24
Setting detail: DERMATOLOGY
End: 2024-01-03

## 2024-01-03 DIAGNOSIS — L70.0 ACNE VULGARIS: ICD-10-CM

## 2024-01-03 PROCEDURE — ? URINE PREGNANCY TEST

## 2024-01-03 PROCEDURE — ? PRESCRIPTION

## 2024-01-03 PROCEDURE — 99214 OFFICE O/P EST MOD 30 MIN: CPT

## 2024-01-03 PROCEDURE — ? ADDITIONAL NOTES

## 2024-01-03 PROCEDURE — ? COUNSELING

## 2024-01-03 PROCEDURE — 81025 URINE PREGNANCY TEST: CPT

## 2024-01-03 PROCEDURE — ? ISOTRETINOIN MONITORING

## 2024-01-03 RX ORDER — ISOTRETINOIN 40 MG/1
1 CAPSULE ORAL BID
Qty: 60 | Refills: 0 | Status: ERX

## 2024-01-03 NOTE — PROCEDURE: URINE PREGNANCY TEST
Expiration Date (Optional): 1/7/25
Urine Pregnancy Test Result: negative
Detail Level: None
Lot # (Optional): [id number]
Performed By: Aurora Desouza MA pregnancy test controls validated

## 2024-01-03 NOTE — PROCEDURE: ADDITIONAL NOTES
Render Risk Assessment In Note?: no
Detail Level: Simple
Additional Notes: Patient has tried multiple topical medications, doxycycline and minocycline without improvement

## 2024-01-03 NOTE — PROCEDURE: ISOTRETINOIN MONITORING
Calculate Months Of Therapy Based On Documented Dosages (Will Hide Months Of Therapy Question)?: No
Are Labs Available For Review?: Yes
Ipledge Number (Optional): [id number]
Cheilitis Normal Treatment: I recommended application of Vaseline or Aquaphor numerous times a day (as often as every hour) and before going to bed.
Headache Monitoring: I recommended monitoring the headaches for now. There is no evidence of increased intracranial pressure. They were instructed to call if the headaches are worsening.
Hypercholesterolemia Monitoring: I explained this is common when taking isotretinoin. We will monitor closely.
Counseling Text: I reviewed the side effect in detail. Patient should get monthly blood tests, not donate blood, not drive at night if vision affected, and not share medication.
Patient Weight (Optional But Required For Cumulative Dose-Numbers And Decimals Only): 150
Female Pregnancy Counseling Text: Female patients should also be on two forms of birth control while taking this medication and for one month after their last dose.
Xerosis Normal Treatment: I recommended application of Cetaphil or CeraVe numerous times a day going to bed to all dry areas.
Use Therapeutic Ranged Or Therapeutic Target: please select Range or Target
Cheilitis Aggressive Treatment: I recommended application of Vaseline or Aquaphor numerous times a day (as often as every hour) and before going to bed. I also prescribed a topical steroid for twice daily use.
Weight Units: pounds
Hypertriglyceridemia Monitoring: I explained this is common when taking isotretinoin. If this worsens they will contact us.
Pounds Preamble Statement (Weight Entered In Details Tab): Reported Weight in pounds:
Female Completion Statement: After discussing her treatment course we decided to discontinue isotretinoin therapy at this time. I explained that she would need to continue her birth control methods for at least one month after the last dosage. She should also get a pregnancy test one month after the last dose. She shouldn't donate blood for one month after the last dose. She should call with any new symptoms of depression.
Retinoid Dermatitis Normal Treatment: I recommended more frequent application of Cetaphil or CeraVe to the areas of dermatitis.
Myalgia Treatment: I explained this is common when taking isotretinoin. If this worsens they will contact us. They may try OTC ibuprofen.
Months Of Therapy Completed: 1
Xerosis Aggressive Treatment: I recommended application of Cetaphil or CeraVe numerous times a day going to bed to all dry areas. I also prescribed a topical steroid for twice daily use.
Kilograms Preamble Statement (Weight Entered In Details Tab): Reported Weight in kilograms:
Is Cheilitis Present?: Yes - Normal Treatment
Lower Range (In Mg/Kg): 120
Nosebleeds Normal Treatment: I explained this is common when taking isotretinoin. I recommended saline mist in each nostril multiple times a day. If this worsens they will contact us.
Dosing Month 1 (Required For Cumulative Dosing): 40mg Daily
Male Completion Statement: After discussing his treatment course we decided to discontinue isotretinoin therapy at this time. He shouldn't donate blood for one month after the last dose. He should call with any new symptoms of depression.
Xerosis Normal Treatment: I recommended application of Cetaphil or CeraVe numerous times a day and before going to bed to all dry areas.
Dosing Month 2 (Required For Cumulative Dosing): 40mg BID
Next Month's Dosage: Continue Current Dosage
Target Cumulative Dosage (In Mg/Kg): 135
Xerosis Aggressive Treatment: I recommended application of Cetaphil or CeraVe numerous times a day and before going to bed to all dry areas. I also prescribed a topical steroid for twice daily use.
Detail Level: Zone
Retinoid Dermatitis Aggressive Treatment: I recommended more frequent application of Cetaphil or CeraVe to the areas of dermatitis. I also prescribed a topical steroid for twice daily use until the dermatitis resolves.
What Is The Patient's Gender: Female
Brand Name (Optional): Nexplanon

## 2024-01-05 RX ORDER — ISOTRETINOIN 40 MG/1
1 CAPSULE ORAL BID
Qty: 60 | Refills: 0 | Status: ERX

## 2024-01-10 RX ORDER — FLUTICASONE PROPIONATE 50 MCG
1 SPRAY, SUSPENSION (ML) NASAL DAILY
Qty: 48 ML | Refills: 1 | OUTPATIENT
Start: 2024-01-10

## 2024-02-01 ENCOUNTER — APPOINTMENT (RX ONLY)
Dept: URBAN - METROPOLITAN AREA CLINIC 35 | Facility: CLINIC | Age: 24
Setting detail: DERMATOLOGY
End: 2024-02-01

## 2024-02-01 DIAGNOSIS — L85.3 XEROSIS CUTIS: ICD-10-CM

## 2024-02-01 DIAGNOSIS — L70.0 ACNE VULGARIS: ICD-10-CM | Status: IMPROVED

## 2024-02-01 DIAGNOSIS — Z79.899 OTHER LONG TERM (CURRENT) DRUG THERAPY: ICD-10-CM

## 2024-02-01 PROCEDURE — 99214 OFFICE O/P EST MOD 30 MIN: CPT

## 2024-02-01 PROCEDURE — 81025 URINE PREGNANCY TEST: CPT

## 2024-02-01 PROCEDURE — ? ISOTRETINOIN MONITORING

## 2024-02-01 PROCEDURE — ? PRESCRIPTION

## 2024-02-01 PROCEDURE — ? ORDER TESTS

## 2024-02-01 PROCEDURE — ? URINE PREGNANCY TEST

## 2024-02-01 PROCEDURE — ? COUNSELING

## 2024-02-01 RX ORDER — ISOTRETINOIN 40 MG/1
1 CAPSULE ORAL BID
Qty: 60 | Refills: 0 | Status: ERX

## 2024-02-01 ASSESSMENT — LOCATION DETAILED DESCRIPTION DERM
LOCATION DETAILED: LEFT RADIAL DORSAL HAND
LOCATION DETAILED: PERIUMBILICAL SKIN
LOCATION DETAILED: RIGHT RADIAL DORSAL HAND

## 2024-02-01 ASSESSMENT — LOCATION SIMPLE DESCRIPTION DERM
LOCATION SIMPLE: RIGHT HAND
LOCATION SIMPLE: ABDOMEN
LOCATION SIMPLE: LEFT HAND

## 2024-02-01 ASSESSMENT — LOCATION ZONE DERM
LOCATION ZONE: HAND
LOCATION ZONE: TRUNK

## 2024-02-01 NOTE — PROCEDURE: URINE PREGNANCY TEST
Performed By: Izabella Hogue MA, pregnancy test control validated
Detail Level: None
Urine Pregnancy Test Result: negative
Expiration Date (Optional): 1/31/25
Lot # (Optional): [id number]

## 2024-02-01 NOTE — PROCEDURE: ISOTRETINOIN MONITORING
Hypertriglyceridemia Monitoring: I explained this is common when taking isotretinoin. We will monitor closely.
Cheilitis Aggressive Treatment: I recommended application of Vaseline or Aquaphor numerous times a day (as often as every hour) and before going to bed. I also prescribed a topical steroid for twice daily use.
Xerosis Aggressive Treatment: I recommended application of Cetaphil or CeraVe numerous times a day going to bed to all dry areas. I also prescribed a topical steroid for twice daily use.
Dosing Month 1 (Required For Cumulative Dosing): 40mg Daily
Myalgia Treatment: I explained this is common when taking isotretinoin. If this worsens they will contact us. They may try OTC ibuprofen.
Xerosis Aggressive Treatment: I recommended application of Cetaphil or CeraVe numerous times a day and before going to bed to all dry areas. I also prescribed a topical steroid for twice daily use.
Is Retinoid Dermatitis Present?: Yes - Normal Treatment
Detail Level: Zone
Add Associated Diagnosis When Managing Medication Side Effects: Yes
Hypertriglyceridemia Monitoring: I explained this is common when taking isotretinoin. If this worsens they will contact us.
Pounds Preamble Statement (Weight Entered In Details Tab): Reported Weight in pounds:
Completed Therapy?: No
Are Labs Available For Review?: No- Not Drawn Yet
Retinoid Dermatitis Aggressive Treatment: I recommended more frequent application of Cetaphil or CeraVe to the areas of dermatitis. I also prescribed a topical steroid for twice daily use until the dermatitis resolves.
Cheilitis Normal Treatment: I recommended application of Vaseline or Aquaphor numerous times a day (as often as every hour) and before going to bed.
Any Headache: Yes - Monitoring
Female Pregnancy Counseling Text: Female patients should also be on two forms of birth control while taking this medication and for one month after their last dose.
Kilograms Preamble Statement (Weight Entered In Details Tab): Reported Weight in kilograms:
Ipledge Number (Optional): [id number]
Female Completion Statement: After discussing her treatment course we decided to discontinue isotretinoin therapy at this time. I explained that she would need to continue her birth control methods for at least one month after the last dosage. She should also get a pregnancy test one month after the last dose. She shouldn't donate blood for one month after the last dose. She should call with any new symptoms of depression.
Headache Monitoring: I recommended monitoring the headaches for now. There is no evidence of increased intracranial pressure. They were instructed to call if the headaches are worsening.
What Is The Patient's Gender: Female
Xerosis Normal Treatment: I recommended application of Cetaphil or CeraVe numerous times a day and before going to bed to all dry areas.
Retinoid Dermatitis Normal Treatment: I recommended more frequent application of Cetaphil or CeraVe to the areas of dermatitis.
Xerosis Normal Treatment: I recommended application of Cetaphil or CeraVe numerous times a day going to bed to all dry areas.
Months Of Therapy Completed: 2
Counseling Text: I reviewed the side effect in detail. Patient should get monthly blood tests, not donate blood, not drive at night if vision affected, and not share medication.
Male Completion Statement: After discussing his treatment course we decided to discontinue isotretinoin therapy at this time. He shouldn't donate blood for one month after the last dose. He should call with any new symptoms of depression.
Dosing Month 2 (Required For Cumulative Dosing): 40mg BID
Weight Units: pounds
Nosebleeds Normal Treatment: I explained this is common when taking isotretinoin. I recommended saline mist in each nostril multiple times a day. If this worsens they will contact us.
Lower Range (In Mg/Kg): 120
Upper Range (In Mg/Kg): 150
Next Month's Dosage: Continue Current Dosage
Nosebleeds Aggressive Treatment: Vaseline in anterior nares.
Target Cumulative Dosage (In Mg/Kg): 135
Any Nosebleeds?: Yes - Aggressive Treatment
Use Therapeutic Ranged Or Therapeutic Target: please select Range or Target

## 2024-02-01 NOTE — PROCEDURE: HIGH RISK MEDICATION MONITORING
Odomzo Pregnancy And Lactation Text: This medication is Pregnancy Category X and is absolutely contraindicated during pregnancy. It is unknown if it is excreted in breast milk.
Xolair Counseling:  Patient informed of potential adverse effects including but not limited to fever, muscle aches, rash and allergic reactions.  The patient verbalized understanding of the proper use and possible adverse effects of Xolair.  All of the patient's questions and concerns were addressed.
Terbinafine Counseling: Patient counseling regarding adverse effects of terbinafine including but not limited to headache, diarrhea, rash, upset stomach, liver function test abnormalities, itching, taste/smell disturbance, nausea, abdominal pain, and flatulence.  There is a rare possibility of liver failure that can occur when taking terbinafine.  The patient understands that a baseline LFT and kidney function test may be required. The patient verbalized understanding of the proper use and possible adverse effects of terbinafine.  All of the patient's questions and concerns were addressed.
SSKI Counseling:  I discussed with the patient the risks of SSKI including but not limited to thyroid abnormalities, metallic taste, GI upset, fever, headache, acne, arthralgias, paraesthesias, lymphadenopathy, easy bleeding, arrhythmias, and allergic reaction.
Griseofulvin Pregnancy And Lactation Text: This medication is Pregnancy Category X and is known to cause serious birth defects. It is unknown if this medication is excreted in breast milk but breast feeding should be avoided.
Rifampin Pregnancy And Lactation Text: This medication is Pregnancy Category C and it isn't know if it is safe during pregnancy. It is also excreted in breast milk and should not be used if you are breast feeding.
Acitretin Pregnancy And Lactation Text: This medication is Pregnancy Category X and should not be given to women who are pregnant or may become pregnant in the future. This medication is excreted in breast milk.
Valtrex Counseling: I discussed with the patient the risks of valacyclovir including but not limited to kidney damage, nausea, vomiting and severe allergy.  The patient understands that if the infection seems to be worsening or is not improving, they are to call.
Elidel Counseling: Patient may experience a mild burning sensation during topical application. Elidel is not approved in children less than 2 years of age. There have been case reports of hematologic and skin malignancies in patients using topical calcineurin inhibitors although causality is questionable.
Cyclosporine Pregnancy And Lactation Text: This medication is Pregnancy Category C and it isn't know if it is safe during pregnancy. This medication is excreted in breast milk.
Tetracycline Counseling: Patient counseled regarding possible photosensitivity and increased risk for sunburn.  Patient instructed to avoid sunlight, if possible.  When exposed to sunlight, patients should wear protective clothing, sunglasses, and sunscreen.  The patient was instructed to call the office immediately if the following severe adverse effects occur:  hearing changes, easy bruising/bleeding, severe headache, or vision changes.  The patient verbalized understanding of the proper use and possible adverse effects of tetracycline.  All of the patient's questions and concerns were addressed. Patient understands to avoid pregnancy while on therapy due to potential birth defects.
Protopic Counseling: Patient may experience a mild burning sensation during topical application. Protopic is not approved in children less than 2 years of age. There have been case reports of hematologic and skin malignancies in patients using topical calcineurin inhibitors although causality is questionable.
Minoxidil Pregnancy And Lactation Text: This medication has not been assigned a Pregnancy Risk Category but animal studies failed to show danger with the topical medication. It is unknown if the medication is excreted in breast milk.
Zyclara Counseling:  I discussed with the patient the risks of imiquimod including but not limited to erythema, scaling, itching, weeping, crusting, and pain.  Patient understands that the inflammatory response to imiquimod is variable from person to person and was educated regarded proper titration schedule.  If flu-like symptoms develop, patient knows to discontinue the medication and contact us.
Simponi Pregnancy And Lactation Text: The risk during pregnancy and breastfeeding is uncertain with this medication.
Odomzo Counseling- I discussed with the patient the risks of Odomzo including but not limited to nausea, vomiting, diarrhea, constipation, weight loss, changes in the sense of taste, decreased appetite, muscle spasms, and hair loss.  The patient verbalized understanding of the proper use and possible adverse effects of Odomzo.  All of the patient's questions and concerns were addressed.
Isotretinoin Counseling: Patient should get monthly blood tests, not donate blood, not drive at night if vision affected, not share medication, and not undergo elective surgery for 6 months after tx completed. Side effects reviewed, pt to contact office should one occur.
Glycopyrrolate Pregnancy And Lactation Text: This medication is Pregnancy Category B and is considered safe during pregnancy. It is unknown if it is excreted breast milk.
Minocycline Pregnancy And Lactation Text: This medication is Pregnancy Category D and not consider safe during pregnancy. It is also excreted in breast milk.
Albendazole Counseling:  I discussed with the patient the risks of albendazole including but not limited to cytopenia, kidney damage, nausea/vomiting and severe allergy.  The patient understands that this medication is being used in an off-label manner.
Imiquimod Counseling:  I discussed with the patient the risks of imiquimod including but not limited to erythema, scaling, itching, weeping, crusting, and pain.  Patient understands that the inflammatory response to imiquimod is variable from person to person and was educated regarded proper titration schedule.  If flu-like symptoms develop, patient knows to discontinue the medication and contact us.
Imiquimod Pregnancy And Lactation Text: This medication is Pregnancy Category C. It is unknown if this medication is excreted in breast milk.
Ketoconazole Counseling:   Patient counseled regarding improving absorption with orange juice.  Adverse effects include but are not limited to breast enlargement, headache, diarrhea, nausea, upset stomach, liver function test abnormalities, taste disturbance, and stomach pain.  There is a rare possibility of liver failure that can occur when taking ketoconazole. The patient understands that monitoring of LFTs may be required, especially at baseline. The patient verbalized understanding of the proper use and possible adverse effects of ketoconazole.  All of the patient's questions and concerns were addressed.
Topical Clindamycin Pregnancy And Lactation Text: This medication is Pregnancy Category B and is considered safe during pregnancy. It is unknown if it is excreted in breast milk.
Cyclophosphamide Pregnancy And Lactation Text: This medication is Pregnancy Category D and it isn't considered safe during pregnancy. This medication is excreted in breast milk.
Colchicine Counseling:  Patient counseled regarding adverse effects including but not limited to stomach upset (nausea, vomiting, stomach pain, or diarrhea).  Patient instructed to limit alcohol consumption while taking this medication.  Colchicine may reduce blood counts especially with prolonged use.  The patient understands that monitoring of kidney function and blood counts may be required, especially at baseline. The patient verbalized understanding of the proper use and possible adverse effects of colchicine.  All of the patient's questions and concerns were addressed.
Drysol Counseling:  I discussed with the patient the risks of drysol/aluminum chloride including but not limited to skin rash, itching, irritation, burning.
Cosentyx Counseling:  I discussed with the patient the risks of Cosentyx including but not limited to worsening of Crohn's disease, immunosuppression, allergic reactions and infections.  The patient understands that monitoring is required including a PPD at baseline and must alert us or the primary physician if symptoms of infection or other concerning signs are noted.
Enbrel Pregnancy And Lactation Text: This medication is Pregnancy Category B and is considered safe during pregnancy. It is unknown if this medication is excreted in breast milk.
Humira Counseling:  I discussed with the patient the risks of adalimumab including but not limited to myelosuppression, immunosuppression, autoimmune hepatitis, demyelinating diseases, lymphoma, and serious infections.  The patient understands that monitoring is required including a PPD at baseline and must alert us or the primary physician if symptoms of infection or other concerning signs are noted.
Cephalexin Pregnancy And Lactation Text: This medication is Pregnancy Category B and considered safe during pregnancy.  It is also excreted in breast milk but can be used safely for shorter doses.
Benzoyl Peroxide Counseling: Patient counseled that medicine may cause skin irritation and bleach clothing.  In the event of skin irritation, the patient was advised to reduce the amount of the drug applied or use it less frequently.   The patient verbalized understanding of the proper use and possible adverse effects of benzoyl peroxide.  All of the patient's questions and concerns were addressed.
Gabapentin Counseling: I discussed with the patient the risks of gabapentin including but not limited to dizziness, somnolence, fatigue and ataxia.
Doxycycline Counseling:  Patient counseled regarding possible photosensitivity and increased risk for sunburn.  Patient instructed to avoid sunlight, if possible.  When exposed to sunlight, patients should wear protective clothing, sunglasses, and sunscreen.  The patient was instructed to call the office immediately if the following severe adverse effects occur:  hearing changes, easy bruising/bleeding, severe headache, or vision changes.  The patient verbalized understanding of the proper use and possible adverse effects of doxycycline.  All of the patient's questions and concerns were addressed.
Picato Counseling:  I discussed with the patient the risks of Picato including but not limited to erythema, scaling, itching, weeping, crusting, and pain.
Hydroxychloroquine Pregnancy And Lactation Text: This medication has been shown to cause fetal harm but it isn't assigned a Pregnancy Risk Category. There are small amounts excreted in breast milk.
Dapsone Counseling: I discussed with the patient the risks of dapsone including but not limited to hemolytic anemia, agranulocytosis, rashes, methemoglobinemia, kidney failure, peripheral neuropathy, headaches, GI upset, and liver toxicity.  Patients who start dapsone require monitoring including baseline LFTs and weekly CBCs for the first month, then every month thereafter.  The patient verbalized understanding of the proper use and possible adverse effects of dapsone.  All of the patient's questions and concerns were addressed.
Topical Retinoid counseling:  Patient advised to apply a pea-sized amount only at bedtime and wait 30 minutes after washing their face before applying.  If too drying, patient may add a non-comedogenic moisturizer. The patient verbalized understanding of the proper use and possible adverse effects of retinoids.  All of the patient's questions and concerns were addressed.
Drysol Pregnancy And Lactation Text: This medication is considered safe during pregnancy and breast feeding.
Ivermectin Pregnancy And Lactation Text: This medication is Pregnancy Category C and it isn't known if it is safe during pregnancy. It is also excreted in breast milk.
Metronidazole Counseling:  I discussed with the patient the risks of metronidazole including but not limited to seizures, nausea/vomiting, a metallic taste in the mouth, nausea/vomiting and severe allergy.
Ivermectin Counseling:  Patient instructed to take medication on an empty stomach with a full glass of water.  Patient informed of potential adverse effects including but not limited to nausea, diarrhea, dizziness, itching, and swelling of the extremities or lymph nodes.  The patient verbalized understanding of the proper use and possible adverse effects of ivermectin.  All of the patient's questions and concerns were addressed.
Quinolones Counseling:  I discussed with the patient the risks of fluoroquinolones including but not limited to GI upset, allergic reaction, drug rash, diarrhea, dizziness, photosensitivity, yeast infections, liver function test abnormalities, tendonitis/tendon rupture.
Rituxan Counseling:  I discussed with the patient the risks of Rituxan infusions. Side effects can include infusion reactions, severe drug rashes including mucocutaneous reactions, reactivation of latent hepatitis and other infections and rarely progressive multifocal leukoencephalopathy.  All of the patient's questions and concerns were addressed.
Erythromycin Pregnancy And Lactation Text: This medication is Pregnancy Category B and is considered safe during pregnancy. It is also excreted in breast milk.
High Dose Vitamin A Counseling: Side effects reviewed, pt to contact office should one occur.
Eucrisa Counseling: Patient may experience a mild burning sensation during topical application. Eucrisa is not approved in children less than 2 years of age.
Simponi Counseling:  I discussed with the patient the risks of golimumab including but not limited to myelosuppression, immunosuppression, autoimmune hepatitis, demyelinating diseases, lymphoma, and serious infections.  The patient understands that monitoring is required including a PPD at baseline and must alert us or the primary physician if symptoms of infection or other concerning signs are noted.
Include Pregnancy/Lactation Warning?: No
Cephalexin Counseling: I counseled the patient regarding use of cephalexin as an antibiotic for prophylactic and/or therapeutic purposes. Cephalexin (commonly prescribed under brand name Keflex) is a cephalosporin antibiotic which is active against numerous classes of bacteria, including most skin bacteria. Side effects may include nausea, diarrhea, gastrointestinal upset, rash, hives, yeast infections, and in rare cases, hepatitis, kidney disease, seizures, fever, confusion, neurologic symptoms, and others. Patients with severe allergies to penicillin medications are cautioned that there is about a 10% incidence of cross-reactivity with cephalosporins. When possible, patients with penicillin allergies should use alternatives to cephalosporins for antibiotic therapy.
Tazorac Pregnancy And Lactation Text: This medication is not safe during pregnancy. It is unknown if this medication is excreted in breast milk.
Hydroxychloroquine Counseling:  I discussed with the patient that a baseline ophthalmologic exam is needed at the start of therapy and every year thereafter while on therapy. A CBC may also be warranted for monitoring.  The side effects of this medication were discussed with the patient, including but not limited to agranulocytosis, aplastic anemia, seizures, rashes, retinopathy, and liver toxicity. Patient instructed to call the office should any adverse effect occur.  The patient verbalized understanding of the proper use and possible adverse effects of Plaquenil.  All the patient's questions and concerns were addressed.
Birth Control Pills Counseling: Birth Control Pill Counseling: I discussed with the patient the potential side effects of OCPs including but not limited to increased risk of stroke, heart attack, thrombophlebitis, deep venous thrombosis, hepatic adenomas, breast changes, GI upset, headaches, and depression.  The patient verbalized understanding of the proper use and possible adverse effects of OCPs. All of the patient's questions and concerns were addressed.
Dupixent Counseling: I discussed with the patient the risks of dupilumab including but not limited to eye infection and irritation, cold sores, injection site reactions, worsening of asthma, allergic reactions and increased risk of parasitic infection.  Live vaccines should be avoided while taking dupilumab. Dupilumab will also interact with certain medications such as warfarin and cyclosporine. The patient understands that monitoring is required and they must alert us or the primary physician if symptoms of infection or other concerning signs are noted.
Doxepin Counseling:  Patient advised that the medication is sedating and not to drive a car after taking this medication. Patient informed of potential adverse effects including but not limited to dry mouth, urinary retention, and blurry vision.  The patient verbalized understanding of the proper use and possible adverse effects of doxepin.  All of the patient's questions and concerns were addressed.
Azathioprine Counseling:  I discussed with the patient the risks of azathioprine including but not limited to myelosuppression, immunosuppression, hepatotoxicity, lymphoma, and infections.  The patient understands that monitoring is required including baseline LFTs, Creatinine, possible TPMP genotyping and weekly CBCs for the first month and then every 2 weeks thereafter.  The patient verbalized understanding of the proper use and possible adverse effects of azathioprine.  All of the patient's questions and concerns were addressed.
Topical Sulfur Applications Pregnancy And Lactation Text: This medication is Pregnancy Category C and has an unknown safety profile during pregnancy. It is unknown if this topical medication is excreted in breast milk.
Cimzia Counseling:  I discussed with the patient the risks of Cimzia including but not limited to immunosuppression, allergic reactions and infections.  The patient understands that monitoring is required including a PPD at baseline and must alert us or the primary physician if symptoms of infection or other concerning signs are noted.
Hydroquinone Counseling:  Patient advised that medication may result in skin irritation, lightening (hypopigmentation), dryness, and burning.  In the event of skin irritation, the patient was advised to reduce the amount of the drug applied or use it less frequently.  Rarely, spots that are treated with hydroquinone can become darker (pseudoochronosis).  Should this occur, patient instructed to stop medication and call the office. The patient verbalized understanding of the proper use and possible adverse effects of hydroquinone.  All of the patient's questions and concerns were addressed.
Bexarotene Pregnancy And Lactation Text: This medication is Pregnancy Category X and should not be given to women who are pregnant or may become pregnant. This medication should not be used if you are breast feeding.
Topical Clindamycin Counseling: Patient counseled that this medication may cause skin irritation or allergic reactions.  In the event of skin irritation, the patient was advised to reduce the amount of the drug applied or use it less frequently.   The patient verbalized understanding of the proper use and possible adverse effects of clindamycin.  All of the patient's questions and concerns were addressed.
Hydroxyzine Pregnancy And Lactation Text: This medication is not safe during pregnancy and should not be taken. It is also excreted in breast milk and breast feeding isn't recommended.
Xolair Pregnancy And Lactation Text: This medication is Pregnancy Category B and is considered safe during pregnancy. This medication is excreted in breast milk.
Bactrim Pregnancy And Lactation Text: This medication is Pregnancy Category D and is known to cause fetal risk.  It is also excreted in breast milk.
Minoxidil Counseling: Minoxidil is a topical medication which can increase blood flow where it is applied. It is uncertain how this medication increases hair growth. Side effects are uncommon and include stinging and allergic reactions.
Arava Counseling:  Patient counseled regarding adverse effects of Arava including but not limited to nausea, vomiting, abnormalities in liver function tests. Patients may develop mouth sores, rash, diarrhea, and abnormalities in blood counts. The patient understands that monitoring is required including LFTs and blood counts.  There is a rare possibility of scarring of the liver and lung problems that can occur when taking methotrexate. Persistent nausea, loss of appetite, pale stools, dark urine, cough, and shortness of breath should be reported immediately. Patient advised to discontinue Arava treatment and consult with a physician prior to attempting conception. The patient will have to undergo a treatment to eliminate Arava from the body prior to conception.
Cyclophosphamide Counseling:  I discussed with the patient the risks of cyclophosphamide including but not limited to hair loss, hormonal abnormalities, decreased fertility, abdominal pain, diarrhea, nausea and vomiting, bone marrow suppression and infection. The patient understands that monitoring is required while taking this medication.
Xeljanz Counseling: I discussed with the patient the risks of Xeljanz therapy including increased risk of infection, liver issues, headache, diarrhea, or cold symptoms. Live vaccines should be avoided. They were instructed to call if they have any problems.
Birth Control Pills Pregnancy And Lactation Text: This medication should be avoided if pregnant and for the first 30 days post-partum.
Carac Counseling:  I discussed with the patient the risks of Carac including but not limited to erythema, scaling, itching, weeping, crusting, and pain.
Gabapentin Pregnancy And Lactation Text: This medication is Pregnancy Category C and isn't considered safe during pregnancy. It is excreted in breast milk.
Erythromycin Counseling:  I discussed with the patient the risks of erythromycin including but not limited to GI upset, allergic reaction, drug rash, diarrhea, increase in liver enzymes, and yeast infections.
Metronidazole Pregnancy And Lactation Text: This medication is Pregnancy Category B and considered safe during pregnancy.  It is also excreted in breast milk.
Solaraze Pregnancy And Lactation Text: This medication is Pregnancy Category B and is considered safe. There is some data to suggest avoiding during the third trimester. It is unknown if this medication is excreted in breast milk.
Fluconazole Pregnancy And Lactation Text: This medication is Pregnancy Category C and it isn't know if it is safe during pregnancy. It is also excreted in breast milk.
Erivedge Counseling- I discussed with the patient the risks of Erivedge including but not limited to nausea, vomiting, diarrhea, constipation, weight loss, changes in the sense of taste, decreased appetite, muscle spasms, and hair loss.  The patient verbalized understanding of the proper use and possible adverse effects of Erivedge.  All of the patient's questions and concerns were addressed.
Azithromycin Counseling:  I discussed with the patient the risks of azithromycin including but not limited to GI upset, allergic reaction, drug rash, diarrhea, and yeast infections.
Cellcept Pregnancy And Lactation Text: This medication is Pregnancy Category D and isn't considered safe during pregnancy. It is unknown if this medication is excreted in breast milk.
Dapsone Pregnancy And Lactation Text: This medication is Pregnancy Category C and is not considered safe during pregnancy or breast feeding.
Cyclosporine Counseling:  I discussed with the patient the risks of cyclosporine including but not limited to hypertension, gingival hyperplasia,myelosuppression, immunosuppression, liver damage, kidney damage, neurotoxicity, lymphoma, and serious infections. The patient understands that monitoring is required including baseline blood pressure, CBC, CMP, lipid panel and uric acid, and then 1-2 times monthly CMP and blood pressure.
Minocycline Counseling: Patient advised regarding possible photosensitivity and discoloration of the teeth, skin, lips, tongue and gums.  Patient instructed to avoid sunlight, if possible.  When exposed to sunlight, patients should wear protective clothing, sunglasses, and sunscreen.  The patient was instructed to call the office immediately if the following severe adverse effects occur:  hearing changes, easy bruising/bleeding, severe headache, or vision changes.  The patient verbalized understanding of the proper use and possible adverse effects of minocycline.  All of the patient's questions and concerns were addressed.
Protopic Pregnancy And Lactation Text: This medication is Pregnancy Category C. It is unknown if this medication is excreted in breast milk when applied topically.
Cimetidine Counseling:  I discussed with the patient the risks of Cimetidine including but not limited to gynecomastia, headache, diarrhea, nausea, drowsiness, arrhythmias, pancreatitis, skin rashes, psychosis, bone marrow suppression and kidney toxicity.
Tazorac Counseling:  Patient advised that medication is irritating and drying.  Patient may need to apply sparingly and wash off after an hour before eventually leaving it on overnight.  The patient verbalized understanding of the proper use and possible adverse effects of tazorac.  All of the patient's questions and concerns were addressed.
Dupixent Pregnancy And Lactation Text: This medication likely crosses the placenta but the risk for the fetus is uncertain. This medication is excreted in breast milk.
Spironolactone Counseling: Patient advised regarding risks of diarrhea, abdominal pain, hyperkalemia, birth defects (for female patients), liver toxicity and renal toxicity. The patient may need blood work to monitor liver and kidney function and potassium levels while on therapy. The patient verbalized understanding of the proper use and possible adverse effects of spironolactone.  All of the patient's questions and concerns were addressed.
Thalidomide Counseling: I discussed with the patient the risks of thalidomide including but not limited to birth defects, anxiety, weakness, chest pain, dizziness, cough and severe allergy.
Enbrel Counseling:  I discussed with the patient the risks of etanercept including but not limited to myelosuppression, immunosuppression, autoimmune hepatitis, demyelinating diseases, lymphoma, and infections.  The patient understands that monitoring is required including a PPD at baseline and must alert us or the primary physician if symptoms of infection or other concerning signs are noted.
Hydroxyzine Counseling: Patient advised that the medication is sedating and not to drive a car after taking this medication.  Patient informed of potential adverse effects including but not limited to dry mouth, urinary retention, and blurry vision.  The patient verbalized understanding of the proper use and possible adverse effects of hydroxyzine.  All of the patient's questions and concerns were addressed.
Terbinafine Pregnancy And Lactation Text: This medication is Pregnancy Category B and is considered safe during pregnancy. It is also excreted in breast milk and breast feeding isn't recommended.
Bexarotene Counseling:  I discussed with the patient the risks of bexarotene including but not limited to hair loss, dry lips/skin/eyes, liver abnormalities, hyperlipidemia, pancreatitis, depression/suicidal ideation, photosensitivity, drug rash/allergic reactions, hypothyroidism, anemia, leukopenia, infection, cataracts, and teratogenicity.  Patient understands that they will need regular blood tests to check lipid profile, liver function tests, white blood cell count, thyroid function tests and pregnancy test if applicable.
Methotrexate Pregnancy And Lactation Text: This medication is Pregnancy Category X and is known to cause fetal harm. This medication is excreted in breast milk.
Azithromycin Pregnancy And Lactation Text: This medication is considered safe during pregnancy and is also secreted in breast milk.
Taltz Counseling: I discussed with the patient the risks of ixekizumab including but not limited to immunosuppression, serious infections, worsening of inflammatory bowel disease and drug reactions.  The patient understands that monitoring is required including a PPD at baseline and must alert us or the primary physician if symptoms of infection or other concerning signs are noted.
Clindamycin Pregnancy And Lactation Text: This medication can be used in pregnancy if certain situations. Clindamycin is also present in breast milk.
Otezla Pregnancy And Lactation Text: This medication is Pregnancy Category C and it isn't known if it is safe during pregnancy. It is unknown if it is excreted in breast milk.
Fluconazole Counseling:  Patient counseled regarding adverse effects of fluconazole including but not limited to headache, diarrhea, nausea, upset stomach, liver function test abnormalities, taste disturbance, and stomach pain.  There is a rare possibility of liver failure that can occur when taking fluconazole.  The patient understands that monitoring of LFTs and kidney function test may be required, especially at baseline. The patient verbalized understanding of the proper use and possible adverse effects of fluconazole.  All of the patient's questions and concerns were addressed.
Nsaids Counseling: NSAID Counseling: I discussed with the patient that NSAIDs should be taken with food. Prolonged use of NSAIDs can result in the development of stomach ulcers.  Patient advised to stop taking NSAIDs if abdominal pain occurs.  The patient verbalized understanding of the proper use and possible adverse effects of NSAIDs.  All of the patient's questions and concerns were addressed.
Isotretinoin Pregnancy And Lactation Text: This medication is Pregnancy Category X and is considered extremely dangerous during pregnancy. It is unknown if it is excreted in breast milk.
Nsaids Pregnancy And Lactation Text: These medications are considered safe up to 30 weeks gestation. It is excreted in breast milk.
Itraconazole Counseling:  I discussed with the patient the risks of itraconazole including but not limited to liver damage, nausea/vomiting, neuropathy, and severe allergy.  The patient understands that this medication is best absorbed when taken with acidic beverages such as non-diet cola or ginger ale.  The patient understands that monitoring is required including baseline LFTs and repeat LFTs at intervals.  The patient understands that they are to contact us or the primary physician if concerning signs are noted.
Xelharryz Pregnancy And Lactation Text: This medication is Pregnancy Category D and is not considered safe during pregnancy.  The risk during breast feeding is also uncertain.
Tremfya Counseling: I discussed with the patient the risks of guselkumab including but not limited to immunosuppression, serious infections, worsening of inflammatory bowel disease and drug reactions.  The patient understands that monitoring is required including a PPD at baseline and must alert us or the primary physician if symptoms of infection or other concerning signs are noted.
Otezla Counseling: The side effects of Otezla were discussed with the patient, including but not limited to worsening or new depression, weight loss, diarrhea, nausea, upper respiratory tract infection, and headache. Patient instructed to call the office should any adverse effect occur.  The patient verbalized understanding of the proper use and possible adverse effects of Otezla.  All the patient's questions and concerns were addressed.
Methotrexate Counseling:  Patient counseled regarding adverse effects of methotrexate including but not limited to nausea, vomiting, abnormalities in liver function tests. Patients may develop mouth sores, rash, diarrhea, and abnormalities in blood counts. The patient understands that monitoring is required including LFT's and blood counts.  There is a rare possibility of scarring of the liver and lung problems that can occur when taking methotrexate. Persistent nausea, loss of appetite, pale stools, dark urine, cough, and shortness of breath should be reported immediately. Patient advised to discontinue methotrexate treatment at least three months before attempting to become pregnant.  I discussed the need for folate supplements while taking methotrexate.  These supplements can decrease side effects during methotrexate treatment. The patient verbalized understanding of the proper use and possible adverse effects of methotrexate.  All of the patient's questions and concerns were addressed.
Doxepin Pregnancy And Lactation Text: This medication is Pregnancy Category C and it isn't known if it is safe during pregnancy. It is also excreted in breast milk and breast feeding isn't recommended.
Stelara Counseling:  I discussed with the patient the risks of ustekinumab including but not limited to immunosuppression, malignancy, posterior leukoencephalopathy syndrome, and serious infections.  The patient understands that monitoring is required including a PPD at baseline and must alert us or the primary physician if symptoms of infection or other concerning signs are noted.
Infliximab Counseling:  I discussed with the patient the risks of infliximab including but not limited to myelosuppression, immunosuppression, autoimmune hepatitis, demyelinating diseases, lymphoma, and serious infections.  The patient understands that monitoring is required including a PPD at baseline and must alert us or the primary physician if symptoms of infection or other concerning signs are noted.
Glycopyrrolate Counseling:  I discussed with the patient the risks of glycopyrrolate including but not limited to skin rash, drowsiness, dry mouth, difficulty urinating, and blurred vision.
Griseofulvin Counseling:  I discussed with the patient the risks of griseofulvin including but not limited to photosensitivity, cytopenia, liver damage, nausea/vomiting and severe allergy.  The patient understands that this medication is best absorbed when taken with a fatty meal (e.g., ice cream or french fries).
Doxycycline Pregnancy And Lactation Text: This medication is Pregnancy Category D and not consider safe during pregnancy. It is also excreted in breast milk but is considered safe for shorter treatment courses.
Cellcept Counseling:  I discussed with the patient the risks of mycophenolate mofetil including but not limited to infection/immunosuppression, GI upset, hypokalemia, hypercholesterolemia, bone marrow suppression, lymphoproliferative disorders, malignancy, GI ulceration/bleed/perforation, colitis, interstitial lung disease, kidney failure, progressive multifocal leukoencephalopathy, and birth defects.  The patient understands that monitoring is required including a baseline creatinine and regular CBC testing. In addition, patient must alert us immediately if symptoms of infection or other concerning signs are noted.
Clindamycin Counseling: I counseled the patient regarding use of clindamycin as an antibiotic for prophylactic and/or therapeutic purposes. Clindamycin is active against numerous classes of bacteria, including skin bacteria. Side effects may include nausea, diarrhea, gastrointestinal upset, rash, hives, yeast infections, and in rare cases, colitis.
Benzoyl Peroxide Pregnancy And Lactation Text: This medication is Pregnancy Category C. It is unknown if benzoyl peroxide is excreted in breast milk.
Prednisone Counseling:  I discussed with the patient the risks of prolonged use of prednisone including but not limited to weight gain, insomnia, osteoporosis, mood changes, diabetes, susceptibility to infection, glaucoma and high blood pressure.  In cases where prednisone use is prolonged, patients should be monitored with blood pressure checks, serum glucose levels and an eye exam.  Additionally, the patient may need to be placed on GI prophylaxis, PCP prophylaxis, and calcium and vitamin D supplementation and/or a bisphosphonate.  The patient verbalized understanding of the proper use and the possible adverse effects of prednisone.  All of the patient's questions and concerns were addressed.
Sski Pregnancy And Lactation Text: This medication is Pregnancy Category D and isn't considered safe during pregnancy. It is excreted in breast milk.
Solaraze Counseling:  I discussed with the patient the risks of Solaraze including but not limited to erythema, scaling, itching, weeping, crusting, and pain.
Valtrex Pregnancy And Lactation Text: this medication is Pregnancy Category B and is considered safe during pregnancy. This medication is not directly found in breast milk but it's metabolite acyclovir is present.
Rifampin Counseling: I discussed with the patient the risks of rifampin including but not limited to liver damage, kidney damage, red-orange body fluids, nausea/vomiting and severe allergy.
5-Fu Pregnancy And Lactation Text: This medication is Pregnancy Category X and contraindicated in pregnancy and in women who may become pregnant. It is unknown if this medication is excreted in breast milk.
Rituxan Pregnancy And Lactation Text: This medication is Pregnancy Category C and it isn't know if it is safe during pregnancy. It is unknown if this medication is excreted in breast milk but similar antibodies are known to be excreted.
Oxybutynin Counseling:  I discussed with the patient the risks of oxybutynin including but not limited to skin rash, drowsiness, dry mouth, difficulty urinating, and blurred vision.
5-Fu Counseling: 5-Fluorouracil Counseling:  I discussed with the patient the risks of 5-fluorouracil including but not limited to erythema, scaling, itching, weeping, crusting, and pain.
Clofazimine Counseling:  I discussed with the patient the risks of clofazimine including but not limited to skin and eye pigmentation, liver damage, nausea/vomiting, gastrointestinal bleeding and allergy.
Acitretin Counseling:  I discussed with the patient the risks of acitretin including but not limited to hair loss, dry lips/skin/eyes, liver damage, hyperlipidemia, depression/suicidal ideation, photosensitivity.  Serious rare side effects can include but are not limited to pancreatitis, pseudotumor cerebri, bony changes, clot formation/stroke/heart attack.  Patient understands that alcohol is contraindicated since it can result in liver toxicity and significantly prolong the elimination of the drug by many years.
Cimzia Pregnancy And Lactation Text: This medication crosses the placenta but can be considered safe in certain situations. Cimzia may be excreted in breast milk.
Spironolactone Pregnancy And Lactation Text: This medication can cause feminization of the male fetus and should be avoided during pregnancy. The active metabolite is also found in breast milk.
Siliq Counseling:  I discussed with the patient the risks of Siliq including but not limited to new or worsening depression, suicidal thoughts and behavior, immunosuppression, malignancy, posterior leukoencephalopathy syndrome, and serious infections.  The patient understands that monitoring is required including a PPD at baseline and must alert us or the primary physician if symptoms of infection or other concerning signs are noted. There is also a special program designed to monitor depression which is required with Siliq.
High Dose Vitamin A Pregnancy And Lactation Text: High dose vitamin A therapy is contraindicated during pregnancy and breast feeding.
Topical Sulfur Applications Counseling: Topical Sulfur Counseling: Patient counseled that this medication may cause skin irritation or allergic reactions.  In the event of skin irritation, the patient was advised to reduce the amount of the drug applied or use it less frequently.   The patient verbalized understanding of the proper use and possible adverse effects of topical sulfur application.  All of the patient's questions and concerns were addressed.
Detail Level: Detailed
Ketoconazole Pregnancy And Lactation Text: This medication is Pregnancy Category C and it isn't know if it is safe during pregnancy. It is also excreted in breast milk and breast feeding isn't recommended.
Bactrim Counseling:  I discussed with the patient the risks of sulfa antibiotics including but not limited to GI upset, allergic reaction, drug rash, diarrhea, dizziness, photosensitivity, and yeast infections.  Rarely, more serious reactions can occur including but not limited to aplastic anemia, agranulocytosis, methemoglobinemia, blood dyscrasias, liver or kidney failure, lung infiltrates or desquamative/blistering drug rashes.

## 2024-02-08 ENCOUNTER — OFFICE VISIT (OUTPATIENT)
Dept: URGENT CARE | Facility: CLINIC | Age: 24
End: 2024-02-08
Payer: COMMERCIAL

## 2024-02-08 VITALS
RESPIRATION RATE: 14 BRPM | HEIGHT: 68 IN | HEART RATE: 65 BPM | DIASTOLIC BLOOD PRESSURE: 60 MMHG | TEMPERATURE: 99 F | BODY MASS INDEX: 23.49 KG/M2 | WEIGHT: 155 LBS | SYSTOLIC BLOOD PRESSURE: 118 MMHG | OXYGEN SATURATION: 100 %

## 2024-02-08 DIAGNOSIS — B34.9 ACUTE VIRAL SYNDROME: ICD-10-CM

## 2024-02-08 LAB
FLUAV RNA SPEC QL NAA+PROBE: NEGATIVE
FLUBV RNA SPEC QL NAA+PROBE: NEGATIVE
RSV RNA SPEC QL NAA+PROBE: NEGATIVE
S PYO DNA SPEC NAA+PROBE: NOT DETECTED
SARS-COV-2 RNA RESP QL NAA+PROBE: NEGATIVE

## 2024-02-08 PROCEDURE — 99213 OFFICE O/P EST LOW 20 MIN: CPT | Performed by: NURSE PRACTITIONER

## 2024-02-08 PROCEDURE — 87651 STREP A DNA AMP PROBE: CPT | Performed by: NURSE PRACTITIONER

## 2024-02-08 PROCEDURE — 3074F SYST BP LT 130 MM HG: CPT | Performed by: NURSE PRACTITIONER

## 2024-02-08 PROCEDURE — 3078F DIAST BP <80 MM HG: CPT | Performed by: NURSE PRACTITIONER

## 2024-02-08 PROCEDURE — 0241U POCT CEPHEID COV-2, FLU A/B, RSV - PCR: CPT | Performed by: NURSE PRACTITIONER

## 2024-02-08 RX ORDER — ISOTRETINOIN 40 MG/1
40 CAPSULE ORAL
COMMUNITY
Start: 2023-11-30

## 2024-02-08 NOTE — LETTER
February 8, 2024    To Whom It May Concern:         This is confirmation that Avani Hutchinson attended her scheduled appointment with CODEY Cowan on 2/08/24. Please excuse from work due to febrile illness.       Sincerely,          GILL Cowan.  [phone]

## 2024-02-09 NOTE — PROGRESS NOTES
Date: 02/08/24        Chief Complaint   Patient presents with    Fever     Today, possible fever.        History of Present Illness: 24 y.o. female  presents to clinic with body aches, mild sore throat, and nausea. She denies cough, vomiting or diarrhea. She has had coworkers who have been ill as well. She has not taken any otc medications for her symptoms. No sob chest pain or lower leg swelling.       ROS:  As stated in HPI       Medical/SX/ Social History:  Reviewed per chart    Medications:    Current Outpatient Medications on File Prior to Visit   Medication Sig Dispense Refill    ABSORICA 40 MG capsule Take 40 mg by mouth every day.      thiamine (VITAMIN B-1) 100 MG Tab Take 100 mg by mouth every day.      vitamin D3 (CHOLECALCIFEROL) 1000 Unit (25 mcg) Tab Take 1,000 Units by mouth every day.      Etonogestrel (NEXPLANON SC) Inject 1 Each under the skin see administration instructions. Every 3 years      Tazarotene 0.045 % Lotion  (Patient not taking: Reported on 11/17/2023)       No current facility-administered medications on file prior to visit.        Allergies:    Naproxen     Problem list, medications, and allergies reviewed by myself today in Epic       Physical Exam:  Vitals:    02/08/24 1818   BP: 118/60   Pulse: 65   Resp: 14   Temp: 37.2 °C (99 °F)   SpO2: 100%        Physical Exam  Vitals reviewed.   Constitutional:       General: She is not in acute distress.     Appearance: Normal appearance. She is well-developed. She is not toxic-appearing.   HENT:      Head: Normocephalic and atraumatic.      Right Ear: Tympanic membrane, ear canal and external ear normal.      Left Ear: Tympanic membrane, ear canal and external ear normal.      Nose: Congestion and rhinorrhea present.      Mouth/Throat:      Lips: Pink.      Mouth: Mucous membranes are moist.      Pharynx: Posterior oropharyngeal erythema present.   Eyes:      General: Lids are normal. Gaze aligned appropriately. No allergic shiner or  scleral icterus.     Extraocular Movements: Extraocular movements intact.      Conjunctiva/sclera: Conjunctivae normal.      Pupils: Pupils are equal, round, and reactive to light.   Cardiovascular:      Rate and Rhythm: Normal rate and regular rhythm.      Pulses:           Radial pulses are 2+ on the right side and 2+ on the left side.      Heart sounds: Normal heart sounds.   Pulmonary:      Effort: Pulmonary effort is normal.      Breath sounds: Normal breath sounds. No wheezing.   Musculoskeletal:      Right lower leg: No edema.      Left lower leg: No edema.   Lymphadenopathy:      Cervical: No cervical adenopathy.   Skin:     General: Skin is warm.      Capillary Refill: Capillary refill takes less than 2 seconds.      Coloration: Skin is not cyanotic or pale.      Findings: No rash.   Neurological:      Mental Status: She is alert.      Gait: Gait is intact.   Psychiatric:         Behavior: Behavior normal. Behavior is cooperative.        Diagnostics:      Recent Results (from the past 24 hour(s))   POCT CoV-2, Flu A/B, RSV by PCR    Collection Time: 02/08/24  6:41 PM   Result Value Ref Range    SARS-CoV-2 by PCR Negative Negative, Invalid    Influenza virus A RNA Negative Negative, Invalid    Influenza virus B, PCR Negative Negative, Invalid    RSV, PCR Negative Negative, Invalid   POCT CEPHEID GROUP A STREP - PCR    Collection Time: 02/08/24  6:43 PM   Result Value Ref Range    POC Group A Strep, PCR Not Detected Not Detected, Invalid             Medical Decision Making / Plan :  I personally reviewed prior external notes and test results pertinent to today's visit. Pt is clinically stable at today's acute urgent care visit.  Patient appears nontoxic with no acute distress noted. Appropriate for outpatient care at this time. The patient remained stable during the urgent care visit.     Pleasant 24 y.o. female  presented clinic with HPI exam findings consistent with viral URI.  Fortunately all testing is  negative.  No noted bacterial foci on exam that would indicate any antibiotics at this time.  Discussed self-limiting nature of a viral illness as well as gave anticipatory guidance for postviral cough.   Shared decision-making was utilized with patient for treatment plan. Differential Diagnosis, natural history, and supportive care discussed. Did advise patient on conservative measures for management of symptoms.  Patient is agreeable to pursue adequate rest, adequate hydration, saltwater gargle and Neti pot for any symptoms of upper respiratory congestion.  Over-the-counter analgesia and antipyretics on a p.r.n. basis as needed for pain and fever.  Did discuss over-the-counter cough medications.      1. Acute viral syndrome    - POCT CoV-2, Flu A/B, RSV by PCR  - POCT CEPHEID GROUP A STREP - PCR      Medication discussed included indication for use and the potential benefits and side effects. Education was provided regarding the aforementioned assessments.  All of the patient's questions were answered to their satisfaction at the time of discharge. Patient was encouraged to monitor symptoms closely. Those signs and symptoms which would warrant concern and mandate seeking a higher level of service through the emergency department discussed at length.  Patient stated agreement and understanding of this plan of care.        Disposition:  Patient was discharged in stable condition.    Voice Recognition Disclaimer: Portions of this document were created using voice recognition software. The software does have a chance of producing errors of grammar and possibly content. I have made every reasonable attempt to correct obvious errors, but there may be errors of grammar and possibly content that I did not discover before finalizing the documentation.    Martha Finch, A.P.R.N.

## 2024-02-09 NOTE — PATIENT INSTRUCTIONS
Please be sure to compare all medications used to ensure you are not taking duplicate medications.     Cough     -Over the counter cough medications (robitussin DM, mucinex D, if you have high blood pressure discuss with pharmacist )   -warm tea with honey   - sleep propped on pillows   -cool mist humidifier   -staying hydrated keeps mucus thin       Fever and body aches     -Acetaminophen(tylenol) as needed every 4-6 hours. Do not exceed 3,000mg in a  24 hour period.   -Ibuprofen (Advil)  400 mg as needed every 6-8 hours. Take with food. Do not exceed 3,200 mg in a 24 hour period. ( If you have high blood pressure, GERD, on blood thinners or history of gastric ulcers do not take)       Nasal congestion     -Over the counter Sudafed from behind the pharmacy (discuss with pharmacist)   - Flonase   -Nasal saline or Neti pod   -Afrin, do not use for more than 3 days. If used for more than 3 days can cause rebound congestion   -staying hydrated keeps mucus thin       Sore throat     - Warm salt water gargles   -Tylenol and ibuprofen as above   -cold drinks or treats

## 2024-02-14 ENCOUNTER — OFFICE VISIT (OUTPATIENT)
Dept: MEDICAL GROUP | Facility: IMAGING CENTER | Age: 24
End: 2024-02-14
Payer: COMMERCIAL

## 2024-02-14 VITALS
BODY MASS INDEX: 24.25 KG/M2 | OXYGEN SATURATION: 95 % | SYSTOLIC BLOOD PRESSURE: 112 MMHG | HEIGHT: 68 IN | HEART RATE: 68 BPM | WEIGHT: 160 LBS | RESPIRATION RATE: 16 BRPM | TEMPERATURE: 98.2 F | DIASTOLIC BLOOD PRESSURE: 68 MMHG

## 2024-02-14 DIAGNOSIS — Z01.84 IMMUNITY STATUS TESTING: ICD-10-CM

## 2024-02-14 DIAGNOSIS — Z00.00 HEALTHCARE MAINTENANCE: ICD-10-CM

## 2024-02-14 DIAGNOSIS — Z11.1 SCREENING-PULMONARY TB: ICD-10-CM

## 2024-02-14 PROCEDURE — 1126F AMNT PAIN NOTED NONE PRSNT: CPT | Performed by: FAMILY MEDICINE

## 2024-02-14 PROCEDURE — 3074F SYST BP LT 130 MM HG: CPT | Performed by: FAMILY MEDICINE

## 2024-02-14 PROCEDURE — 3078F DIAST BP <80 MM HG: CPT | Performed by: FAMILY MEDICINE

## 2024-02-14 PROCEDURE — 99212 OFFICE O/P EST SF 10 MIN: CPT | Performed by: FAMILY MEDICINE

## 2024-02-14 ASSESSMENT — PATIENT HEALTH QUESTIONNAIRE - PHQ9: CLINICAL INTERPRETATION OF PHQ2 SCORE: 0

## 2024-02-14 ASSESSMENT — PAIN SCALES - GENERAL: PAINLEVEL: NO PAIN

## 2024-02-14 NOTE — PROGRESS NOTES
"  SUBJECTIVE:    Chief Complaint   Patient presents with    Requesting Labs     Hep B titer        HPI:     Avani Hutchinson is a 24 y.o. female requesting lab work for hep B titers and TB screening.  She will be planning to attend medical school.  Has been otherwise healthy without unusual symptoms.  Has had pap smear done- gynecology.  Will need record release.    ROS:  No recent fevers or chills. No lower extremity edema.    Current Outpatient Medications on File Prior to Visit   Medication Sig Dispense Refill    ABSORICA 40 MG capsule Take 40 mg by mouth every day. Take one tab twice a day      thiamine (VITAMIN B-1) 100 MG Tab Take 100 mg by mouth every day.      vitamin D3 (CHOLECALCIFEROL) 1000 Unit (25 mcg) Tab Take 1,000 Units by mouth every day.      Etonogestrel (NEXPLANON SC) Inject 1 Each under the skin see administration instructions. Every 3 years       No current facility-administered medications on file prior to visit.       Allergies   Allergen Reactions    Naproxen Diarrhea     N/V/D       Patient Active Problem List    Diagnosis Date Noted    Diarrhea 05/18/2021    Abdominal cramping 05/18/2021    Fatigue 09/24/2018    Scoliosis of thoracic spine 09/24/2018    Migraine with aura and with status migrainosus, not intractable 09/24/2018    Chronic pain of left ankle 09/24/2018       Past Medical History:   Diagnosis Date    COVID-19     Migraine 11/2017    Neck pain 9/24/2018    Sinusitis 09/2018         OBJECTIVE:   /68 (BP Location: Right arm, Patient Position: Sitting, BP Cuff Size: Adult)   Pulse 68   Temp 36.8 °C (98.2 °F) (Temporal)   Resp 16   Ht 1.727 m (5' 8\")   Wt 72.6 kg (160 lb)   LMP  (LMP Unknown) Comment: two months ago  SpO2 95%   BMI 24.33 kg/m²   General: Well-developed well-nourished female, no acute distress  Extremities: no cyanosis, clubbing, edema  Skin: Warm and dry  Psych: appropriate mood and affect      ASSESSMENT/PLAN:    24 y.o.female       1. Immunity " status testing  HEP B SURFACE ANTIGEN    HEP B SURFACE AB      2. Screening-pulmonary TB  Quantiferon Gold TB (PPD)      3. Healthcare maintenance        Reviewed health maintenance recommendations.  Will need records from gynecology.    Follow-up as needed.    This medical record contains text that has been entered with the assistance of computer voice recognition and dictation software.  Therefore, it may contain unintended errors in text, spelling, punctuation, or grammar.

## 2024-02-17 ENCOUNTER — HOSPITAL ENCOUNTER (OUTPATIENT)
Dept: LAB | Facility: MEDICAL CENTER | Age: 24
End: 2024-02-17
Attending: FAMILY MEDICINE
Payer: COMMERCIAL

## 2024-02-26 ENCOUNTER — HOSPITAL ENCOUNTER (OUTPATIENT)
Dept: LAB | Facility: MEDICAL CENTER | Age: 24
End: 2024-02-26
Attending: FAMILY MEDICINE
Payer: COMMERCIAL

## 2024-02-26 DIAGNOSIS — Z01.84 IMMUNITY STATUS TESTING: ICD-10-CM

## 2024-02-26 DIAGNOSIS — Z11.1 SCREENING-PULMONARY TB: ICD-10-CM

## 2024-02-26 PROCEDURE — 86480 TB TEST CELL IMMUN MEASURE: CPT

## 2024-02-26 PROCEDURE — 86706 HEP B SURFACE ANTIBODY: CPT

## 2024-02-26 PROCEDURE — 87340 HEPATITIS B SURFACE AG IA: CPT

## 2024-02-26 PROCEDURE — 36415 COLL VENOUS BLD VENIPUNCTURE: CPT

## 2024-02-27 LAB
GAMMA INTERFERON BACKGROUND BLD IA-ACNC: 0.04 IU/ML
HBV SURFACE AB SERPL IA-ACNC: 620 MIU/ML (ref 0–10)
HBV SURFACE AG SER QL: NORMAL
M TB IFN-G BLD-IMP: NEGATIVE
M TB IFN-G CD4+ BCKGRND COR BLD-ACNC: 0 IU/ML
MITOGEN IGNF BCKGRD COR BLD-ACNC: >10 IU/ML
QFT TB2 - NIL TBQ2: 0 IU/ML

## 2024-03-05 ENCOUNTER — APPOINTMENT (RX ONLY)
Dept: URBAN - METROPOLITAN AREA CLINIC 35 | Facility: CLINIC | Age: 24
Setting detail: DERMATOLOGY
End: 2024-03-05

## 2024-03-05 DIAGNOSIS — L85.3 XEROSIS CUTIS: ICD-10-CM

## 2024-03-05 DIAGNOSIS — L70.0 ACNE VULGARIS: ICD-10-CM | Status: IMPROVED

## 2024-03-05 DIAGNOSIS — Z79.899 OTHER LONG TERM (CURRENT) DRUG THERAPY: ICD-10-CM

## 2024-03-05 PROCEDURE — ? PRESCRIPTION

## 2024-03-05 PROCEDURE — ? ISOTRETINOIN MONITORING

## 2024-03-05 PROCEDURE — ? COUNSELING

## 2024-03-05 PROCEDURE — 99214 OFFICE O/P EST MOD 30 MIN: CPT

## 2024-03-05 PROCEDURE — 81025 URINE PREGNANCY TEST: CPT

## 2024-03-05 PROCEDURE — ? URINE PREGNANCY TEST

## 2024-03-05 RX ORDER — ISOTRETINOIN 40 MG/1
1 CAPSULE ORAL BID
Qty: 60 | Refills: 0 | Status: ERX

## 2024-03-05 ASSESSMENT — LOCATION SIMPLE DESCRIPTION DERM
LOCATION SIMPLE: RIGHT HAND
LOCATION SIMPLE: ABDOMEN
LOCATION SIMPLE: LEFT HAND

## 2024-03-05 ASSESSMENT — LOCATION DETAILED DESCRIPTION DERM
LOCATION DETAILED: PERIUMBILICAL SKIN
LOCATION DETAILED: LEFT RADIAL DORSAL HAND
LOCATION DETAILED: RIGHT RADIAL DORSAL HAND

## 2024-03-05 ASSESSMENT — LOCATION ZONE DERM
LOCATION ZONE: TRUNK
LOCATION ZONE: HAND

## 2024-03-05 NOTE — PROCEDURE: URINE PREGNANCY TEST
Performed By: Aurora Desouza MA. Preg. test controls validated.
Detail Level: None
Urine Pregnancy Test Result: negative
Expiration Date (Optional): 1/31/25
Lot # (Optional): [id number]

## 2024-03-05 NOTE — PROCEDURE: ISOTRETINOIN MONITORING
Hypertriglyceridemia Monitoring: I explained this is common when taking isotretinoin. We will monitor closely.
Patient Weight (Optional But Required For Cumulative Dose-Numbers And Decimals Only): 150
Cheilitis Aggressive Treatment: I recommended application of Vaseline or Aquaphor numerous times a day (as often as every hour) and before going to bed. I also prescribed a topical steroid for twice daily use.
Xerosis Aggressive Treatment: I recommended application of Cetaphil or CeraVe numerous times a day going to bed to all dry areas. I also prescribed a topical steroid for twice daily use.
Dosing Month 1 (Required For Cumulative Dosing): 40mg Daily
Myalgia Treatment: I explained this is common when taking isotretinoin. If this worsens they will contact us. They may try OTC ibuprofen.
Xerosis Aggressive Treatment: I recommended application of Cetaphil or CeraVe numerous times a day and before going to bed to all dry areas. I also prescribed a topical steroid for twice daily use.
Is Retinoid Dermatitis Present?: Yes - Normal Treatment
Detail Level: Zone
Add Associated Diagnosis When Managing Medication Side Effects: Yes
Hypertriglyceridemia Monitoring: I explained this is common when taking isotretinoin. If this worsens they will contact us.
Pounds Preamble Statement (Weight Entered In Details Tab): Reported Weight in pounds:
Completed Therapy?: No
Dosing Month 3 (Required For Cumulative Dosing): 40mg BID
Retinoid Dermatitis Aggressive Treatment: I recommended more frequent application of Cetaphil or CeraVe to the areas of dermatitis. I also prescribed a topical steroid for twice daily use until the dermatitis resolves.
Cheilitis Normal Treatment: I recommended application of Vaseline or Aquaphor numerous times a day (as often as every hour) and before going to bed.
Any Headache: Yes - Monitoring
Female Pregnancy Counseling Text: Female patients should also be on two forms of birth control while taking this medication and for one month after their last dose.
Kilograms Preamble Statement (Weight Entered In Details Tab): Reported Weight in kilograms:
Ipledge Number (Optional): [id number]
Female Completion Statement: After discussing her treatment course we decided to discontinue isotretinoin therapy at this time. I explained that she would need to continue her birth control methods for at least one month after the last dosage. She should also get a pregnancy test one month after the last dose. She shouldn't donate blood for one month after the last dose. She should call with any new symptoms of depression.
Headache Monitoring: I recommended monitoring the headaches for now. There is no evidence of increased intracranial pressure. They were instructed to call if the headaches are worsening.
What Is The Patient's Gender: Female
Xerosis Normal Treatment: I recommended application of Cetaphil or CeraVe numerous times a day and before going to bed to all dry areas.
Retinoid Dermatitis Normal Treatment: I recommended more frequent application of Cetaphil or CeraVe to the areas of dermatitis.
Xerosis Normal Treatment: I recommended application of Cetaphil or CeraVe numerous times a day going to bed to all dry areas.
Months Of Therapy Completed: 3
Counseling Text: I reviewed the side effect in detail. Patient should get monthly blood tests, not donate blood, not drive at night if vision affected, and not share medication.
Male Completion Statement: After discussing his treatment course we decided to discontinue isotretinoin therapy at this time. He shouldn't donate blood for one month after the last dose. He should call with any new symptoms of depression.
Weight Units: pounds
Nosebleeds Normal Treatment: I explained this is common when taking isotretinoin. I recommended saline mist in each nostril multiple times a day. If this worsens they will contact us.
Lower Range (In Mg/Kg): 120
Next Month's Dosage: Continue Current Dosage
Nosebleeds Aggressive Treatment: Vaseline in anterior nares.
Brand Name (Optional): Nexplanon
Target Cumulative Dosage (In Mg/Kg): 135
Any Nosebleeds?: Yes - Aggressive Treatment
Use Therapeutic Ranged Or Therapeutic Target: please select Range or Target

## 2024-04-03 ENCOUNTER — APPOINTMENT (RX ONLY)
Dept: URBAN - METROPOLITAN AREA CLINIC 35 | Facility: CLINIC | Age: 24
Setting detail: DERMATOLOGY
End: 2024-04-03

## 2024-04-03 VITALS — WEIGHT: 150 LBS

## 2024-04-03 DIAGNOSIS — Z79.899 OTHER LONG TERM (CURRENT) DRUG THERAPY: ICD-10-CM

## 2024-04-03 DIAGNOSIS — K13.0 DISEASES OF LIPS: ICD-10-CM

## 2024-04-03 DIAGNOSIS — L70.0 ACNE VULGARIS: ICD-10-CM

## 2024-04-03 DIAGNOSIS — L85.3 XEROSIS CUTIS: ICD-10-CM

## 2024-04-03 PROCEDURE — ? PRESCRIPTION

## 2024-04-03 PROCEDURE — ? MEDICATION COUNSELING

## 2024-04-03 PROCEDURE — 99214 OFFICE O/P EST MOD 30 MIN: CPT

## 2024-04-03 PROCEDURE — ? COUNSELING

## 2024-04-03 PROCEDURE — 81025 URINE PREGNANCY TEST: CPT

## 2024-04-03 PROCEDURE — ? TREATMENT REGIMEN

## 2024-04-03 PROCEDURE — ? ISOTRETINOIN MONITORING

## 2024-04-03 PROCEDURE — ? URINE PREGNANCY TEST

## 2024-04-03 RX ORDER — ISOTRETINOIN 40 MG/1
1 CAPSULE ORAL BID
Qty: 60 | Refills: 0 | Status: ERX | COMMUNITY
Start: 2024-04-03

## 2024-04-03 RX ORDER — KETOCONAZOLE 20 MG/G
THIN LAYER CREAM TOPICAL BID
Qty: 15 | Refills: 2 | Status: ERX | COMMUNITY
Start: 2024-04-03

## 2024-04-03 RX ORDER — HYDROCORTISONE 25 MG/G
THIN LAYER OINTMENT TOPICAL BID
Qty: 20 | Refills: 2 | Status: ERX | COMMUNITY
Start: 2024-04-03

## 2024-04-03 RX ADMIN — KETOCONAZOLE THIN LAYER: 20 CREAM TOPICAL at 00:00

## 2024-04-03 RX ADMIN — HYDROCORTISONE THIN LAYER: 25 OINTMENT TOPICAL at 00:00

## 2024-04-03 RX ADMIN — ISOTRETINOIN 1: 40 CAPSULE ORAL at 00:00

## 2024-04-03 ASSESSMENT — LOCATION ZONE DERM
LOCATION ZONE: HAND
LOCATION ZONE: LIP
LOCATION ZONE: TRUNK

## 2024-04-03 ASSESSMENT — LOCATION SIMPLE DESCRIPTION DERM
LOCATION SIMPLE: ABDOMEN
LOCATION SIMPLE: RIGHT LIP
LOCATION SIMPLE: RIGHT HAND
LOCATION SIMPLE: LEFT LIP
LOCATION SIMPLE: LEFT HAND

## 2024-04-03 ASSESSMENT — LOCATION DETAILED DESCRIPTION DERM
LOCATION DETAILED: RIGHT RADIAL DORSAL HAND
LOCATION DETAILED: LEFT RADIAL DORSAL HAND
LOCATION DETAILED: LEFT ORAL COMMISSURE
LOCATION DETAILED: RIGHT UPPER CUTANEOUS LIP
LOCATION DETAILED: PERIUMBILICAL SKIN

## 2024-04-03 NOTE — PROCEDURE: ISOTRETINOIN MONITORING
Hypertriglyceridemia Monitoring: I explained this is common when taking isotretinoin. We will monitor closely.
Patient Weight (Optional But Required For Cumulative Dose-Numbers And Decimals Only): 150
Cheilitis Aggressive Treatment: I recommended application of Vaseline or Aquaphor numerous times a day (as often as every hour) and before going to bed. I also prescribed a topical steroid for twice daily use.
Xerosis Aggressive Treatment: I recommended application of Cetaphil or CeraVe numerous times a day going to bed to all dry areas. I also prescribed a topical steroid for twice daily use.
Dosing Month 1 (Required For Cumulative Dosing): 40mg Daily
Myalgia Treatment: I explained this is common when taking isotretinoin. If this worsens they will contact us. They may try OTC ibuprofen.
Xerosis Aggressive Treatment: I recommended application of Cetaphil or CeraVe numerous times a day and before going to bed to all dry areas. I also prescribed a topical steroid for twice daily use.
Is Retinoid Dermatitis Present?: Yes - Normal Treatment
Detail Level: Zone
Any Hypercholesterolemia?: No
Add Associated Diagnosis When Managing Medication Side Effects: Yes
Hypertriglyceridemia Monitoring: I explained this is common when taking isotretinoin. If this worsens they will contact us.
Pounds Preamble Statement (Weight Entered In Details Tab): Reported Weight in pounds:
Dosing Month 3 (Required For Cumulative Dosing): 40mg BID
Retinoid Dermatitis Aggressive Treatment: I recommended more frequent application of Cetaphil or CeraVe to the areas of dermatitis. I also prescribed a topical steroid for twice daily use until the dermatitis resolves.
Cheilitis Normal Treatment: I recommended application of Vaseline or Aquaphor numerous times a day (as often as every hour) and before going to bed.
Any Headache: Yes - Monitoring
Female Pregnancy Counseling Text: Female patients should also be on two forms of birth control while taking this medication and for one month after their last dose.
Kilograms Preamble Statement (Weight Entered In Details Tab): Reported Weight in kilograms:
Ipledge Number (Optional): [id number]
Female Completion Statement: After discussing her treatment course we decided to discontinue isotretinoin therapy at this time. I explained that she would need to continue her birth control methods for at least one month after the last dosage. She should also get a pregnancy test one month after the last dose. She shouldn't donate blood for one month after the last dose. She should call with any new symptoms of depression.
Headache Monitoring: I recommended monitoring the headaches for now. There is no evidence of increased intracranial pressure. They were instructed to call if the headaches are worsening.
What Is The Patient's Gender: Female
Xerosis Normal Treatment: I recommended application of Cetaphil or CeraVe numerous times a day and before going to bed to all dry areas.
Retinoid Dermatitis Normal Treatment: I recommended more frequent application of Cetaphil or CeraVe to the areas of dermatitis.
Xerosis Normal Treatment: I recommended application of Cetaphil or CeraVe numerous times a day going to bed to all dry areas.
Months Of Therapy Completed: 4
Counseling Text: I reviewed the side effect in detail. Patient should get monthly blood tests, not donate blood, not drive at night if vision affected, and not share medication.
Male Completion Statement: After discussing his treatment course we decided to discontinue isotretinoin therapy at this time. He shouldn't donate blood for one month after the last dose. He should call with any new symptoms of depression.
Weight Units: pounds
Nosebleeds Normal Treatment: I explained this is common when taking isotretinoin. I recommended saline mist in each nostril multiple times a day. If this worsens they will contact us.
Lower Range (In Mg/Kg): 120
Next Month's Dosage: Continue Current Dosage
Nosebleeds Aggressive Treatment: Vaseline in anterior nares.
Brand Name (Optional): Nexplanon
Target Cumulative Dosage (In Mg/Kg): 135
Any Nosebleeds?: Yes - Aggressive Treatment
Use Therapeutic Ranged Or Therapeutic Target: please select Range or Target

## 2024-04-03 NOTE — PROCEDURE: TREATMENT REGIMEN
Detail Level: Zone
Initiate Treatment: Hydrocortisone 2.5% ointment BID to corners of mouth\\nKetoconazole cream BID to corners of mouth.

## 2024-04-03 NOTE — PROCEDURE: MEDICATION COUNSELING
Opzelura Counseling:  I discussed with the patient the risks of Opzelura including but not limited to nasopharngitis, bronchitis, ear infection, eosinophila, hives, diarrhea, folliculitis, tonsillitis, and rhinorrhea.  Taken orally, this medication has been linked to serious infections; higher rate of mortality; malignancy and lymphoproliferative disorders; major adverse cardiovascular events; thrombosis; thrombocytopenia, anemia, and neutropenia; and lipid elevations.
Clindamycin Counseling: I counseled the patient regarding use of clindamycin as an antibiotic for prophylactic and/or therapeutic purposes. Clindamycin is active against numerous classes of bacteria, including skin bacteria. Side effects may include nausea, diarrhea, gastrointestinal upset, rash, hives, yeast infections, and in rare cases, colitis.
Bimzelx Pregnancy And Lactation Text: This medication crosses the placenta and the safety is uncertain during pregnancy. It is unknown if this medication is present in breast milk.
Cyclosporine Pregnancy And Lactation Text: This medication is Pregnancy Category C and it isn't know if it is safe during pregnancy. This medication is excreted in breast milk.
Zoryve Counseling:  I discussed with the patient that Zoryve is not for use in the eyes, mouth or vagina. The most commonly reported side effects include diarrhea, headache, insomnia, application site pain, upper respiratory tract infections, and urinary tract infections.  All of the patient's questions and concerns were addressed.
Clofazimine Pregnancy And Lactation Text: This medication is Pregnancy Category C and isn't considered safe during pregnancy. It is excreted in breast milk.
Olanzapine Pregnancy And Lactation Text: This medication is pregnancy category C.   There are no adequate and well controlled trials with olanzapine in pregnant females.  Olanzapine should be used during pregnancy only if the potential benefit justifies the potential risk to the fetus.   In a study in lactating healthy women, olanzapine was excreted in breast milk.  It is recommended that women taking olanzapine should not breast feed.
Birth Control Pills Counseling: Birth Control Pill Counseling: I discussed with the patient the potential side effects of OCPs including but not limited to increased risk of stroke, heart attack, thrombophlebitis, deep venous thrombosis, hepatic adenomas, breast changes, GI upset, headaches, and depression.  The patient verbalized understanding of the proper use and possible adverse effects of OCPs. All of the patient's questions and concerns were addressed.
Include Pregnancy/Lactation Warning?: No
Hydroxychloroquine Counseling:  I discussed with the patient that a baseline ophthalmologic exam is needed at the start of therapy and every year thereafter while on therapy. A CBC may also be warranted for monitoring.  The side effects of this medication were discussed with the patient, including but not limited to agranulocytosis, aplastic anemia, seizures, rashes, retinopathy, and liver toxicity. Patient instructed to call the office should any adverse effect occur.  The patient verbalized understanding of the proper use and possible adverse effects of Plaquenil.  All the patient's questions and concerns were addressed.
Cantharidin Pregnancy And Lactation Text: This medication has not been proven safe during pregnancy. It is unknown if this medication is excreted in breast milk.
Hydroquinone Pregnancy And Lactation Text: This medication has not been assigned a Pregnancy Risk Category but animal studies failed to show danger with the topical medication. It is unknown if the medication is excreted in breast milk.
Tazorac Counseling:  Patient advised that medication is irritating and drying.  Patient may need to apply sparingly and wash off after an hour before eventually leaving it on overnight.  The patient verbalized understanding of the proper use and possible adverse effects of tazorac.  All of the patient's questions and concerns were addressed.
5-Fu Counseling: 5-Fluorouracil Counseling:  I discussed with the patient the risks of 5-fluorouracil including but not limited to erythema, scaling, itching, weeping, crusting, and pain.
Humira Pregnancy And Lactation Text: This medication is Pregnancy Category B and is considered safe during pregnancy. It is unknown if this medication is excreted in breast milk.
Olumiant Counseling: I discussed with the patient the risks of Olumiant therapy including but not limited to upper respiratory tract infections, shingles, cold sores, and nausea. Live vaccines should be avoided.  This medication has been linked to serious infections; higher rate of mortality; malignancy and lymphoproliferative disorders; major adverse cardiovascular events; thrombosis; gastrointestinal perforations; neutropenia; lymphopenia; anemia; liver enzyme elevations; and lipid elevations.
Odomzo Counseling- I discussed with the patient the risks of Odomzo including but not limited to nausea, vomiting, diarrhea, constipation, weight loss, changes in the sense of taste, decreased appetite, muscle spasms, and hair loss.  The patient verbalized understanding of the proper use and possible adverse effects of Odomzo.  All of the patient's questions and concerns were addressed.
Qbrexza Pregnancy And Lactation Text: There is no available data on Qbrexza use in pregnant women.  There is no available data on Qbrexza use in lactation.
Thalidomide Pregnancy And Lactation Text: This medication is Pregnancy Category X and is absolutely contraindicated during pregnancy. It is unknown if it is excreted in breast milk.
Terbinafine Pregnancy And Lactation Text: This medication is Pregnancy Category B and is considered safe during pregnancy. It is also excreted in breast milk and breast feeding isn't recommended.
Taltz Pregnancy And Lactation Text: The risk during pregnancy and breastfeeding is uncertain with this medication.
Aklief Pregnancy And Lactation Text: It is unknown if this medication is safe to use during pregnancy.  It is unknown if this medication is excreted in breast milk.  Breastfeeding women should use the topical cream on the smallest area of the skin for the shortest time needed while breastfeeding.  Do not apply to nipple and areola.
Siliq Counseling:  I discussed with the patient the risks of Siliq including but not limited to new or worsening depression, suicidal thoughts and behavior, immunosuppression, malignancy, posterior leukoencephalopathy syndrome, and serious infections.  The patient understands that monitoring is required including a PPD at baseline and must alert us or the primary physician if symptoms of infection or other concerning signs are noted. There is also a special program designed to monitor depression which is required with Siliq.
Acitretin Pregnancy And Lactation Text: This medication is Pregnancy Category X and should not be given to women who are pregnant or may become pregnant in the future. This medication is excreted in breast milk.
Clindamycin Pregnancy And Lactation Text: This medication can be used in pregnancy if certain situations. Clindamycin is also present in breast milk.
Zoryve Pregnancy And Lactation Text: It is unknown if this medication can cause problems during pregnancy and breastfeeding.
Fluconazole Counseling:  Patient counseled regarding adverse effects of fluconazole including but not limited to headache, diarrhea, nausea, upset stomach, liver function test abnormalities, taste disturbance, and stomach pain.  There is a rare possibility of liver failure that can occur when taking fluconazole.  The patient understands that monitoring of LFTs and kidney function test may be required, especially at baseline. The patient verbalized understanding of the proper use and possible adverse effects of fluconazole.  All of the patient's questions and concerns were addressed.
Topical Sulfur Applications Counseling: Topical Sulfur Counseling: Patient counseled that this medication may cause skin irritation or allergic reactions.  In the event of skin irritation, the patient was advised to reduce the amount of the drug applied or use it less frequently.   The patient verbalized understanding of the proper use and possible adverse effects of topical sulfur application.  All of the patient's questions and concerns were addressed.
Oral Minoxidil Counseling- I discussed with the patient the risks of oral minoxidil including but not limited to shortness of breath, swelling of the feet or ankles, dizziness, lightheadedness, unwanted hair growth and allergic reaction.  The patient verbalized understanding of the proper use and possible adverse effects of oral minoxidil.  All of the patient's questions and concerns were addressed.
Imiquimod Counseling:  I discussed with the patient the risks of imiquimod including but not limited to erythema, scaling, itching, weeping, crusting, and pain.  Patient understands that the inflammatory response to imiquimod is variable from person to person and was educated regarded proper titration schedule.  If flu-like symptoms develop, patient knows to discontinue the medication and contact us.
Tazorac Pregnancy And Lactation Text: This medication is not safe during pregnancy. It is unknown if this medication is excreted in breast milk.
Hydroxychloroquine Pregnancy And Lactation Text: This medication has been shown to cause fetal harm but it isn't assigned a Pregnancy Risk Category. There are small amounts excreted in breast milk.
Birth Control Pills Pregnancy And Lactation Text: This medication should be avoided if pregnant and for the first 30 days post-partum.
Quinolones Counseling:  I discussed with the patient the risks of fluoroquinolones including but not limited to GI upset, allergic reaction, drug rash, diarrhea, dizziness, photosensitivity, yeast infections, liver function test abnormalities, tendonitis/tendon rupture.
Opzelura Pregnancy And Lactation Text: There is insufficient data to evaluate drug-associated risk for major birth defects, miscarriage, or other adverse maternal or fetal outcomes.  There is a pregnancy registry that monitors pregnancy outcomes in pregnant persons exposed to the medication during pregnancy.  It is unknown if this medication is excreted in breast milk.  Do not breastfeed during treatment and for about 4 weeks after the last dose.
Methotrexate Counseling:  Patient counseled regarding adverse effects of methotrexate including but not limited to nausea, vomiting, abnormalities in liver function tests. Patients may develop mouth sores, rash, diarrhea, and abnormalities in blood counts. The patient understands that monitoring is required including LFT's and blood counts.  There is a rare possibility of scarring of the liver and lung problems that can occur when taking methotrexate. Persistent nausea, loss of appetite, pale stools, dark urine, cough, and shortness of breath should be reported immediately. Patient advised to discontinue methotrexate treatment at least three months before attempting to become pregnant.  I discussed the need for folate supplements while taking methotrexate.  These supplements can decrease side effects during methotrexate treatment. The patient verbalized understanding of the proper use and possible adverse effects of methotrexate.  All of the patient's questions and concerns were addressed.
Cimzia Counseling:  I discussed with the patient the risks of Cimzia including but not limited to immunosuppression, allergic reactions and infections.  The patient understands that monitoring is required including a PPD at baseline and must alert us or the primary physician if symptoms of infection or other concerning signs are noted.
Tremfya Counseling: I discussed with the patient the risks of guselkumab including but not limited to immunosuppression, serious infections, worsening of inflammatory bowel disease and drug reactions.  The patient understands that monitoring is required including a PPD at baseline and must alert us or the primary physician if symptoms of infection or other concerning signs are noted.
Rhofade Counseling: Rhofade is a topical medication which can decrease superficial blood flow where applied. Side effects are uncommon and include stinging, redness and allergic reactions.
Azelaic Acid Counseling: Patient counseled that medicine may cause skin irritation and to avoid applying near the eyes.  In the event of skin irritation, the patient was advised to reduce the amount of the drug applied or use it less frequently.   The patient verbalized understanding of the proper use and possible adverse effects of azelaic acid.  All of the patient's questions and concerns were addressed.
Zyclara Counseling:  I discussed with the patient the risks of imiquimod including but not limited to erythema, scaling, itching, weeping, crusting, and pain.  Patient understands that the inflammatory response to imiquimod is variable from person to person and was educated regarded proper titration schedule.  If flu-like symptoms develop, patient knows to discontinue the medication and contact us.
Fluconazole Pregnancy And Lactation Text: This medication is Pregnancy Category C and it isn't know if it is safe during pregnancy. It is also excreted in breast milk.
5-Fu Pregnancy And Lactation Text: This medication is Pregnancy Category X and contraindicated in pregnancy and in women who may become pregnant. It is unknown if this medication is excreted in breast milk.
Tranexamic Acid Counseling:  Patient advised of the small risk of bleeding problems with tranexamic acid. They were also instructed to call if they developed any nausea, vomiting or diarrhea. All of the patient's questions and concerns were addressed.
Olumiant Pregnancy And Lactation Text: Based on animal studies, Olumiant may cause embryo-fetal harm when administered to pregnant women.  The medication should not be used in pregnancy.  Breastfeeding is not recommended during treatment.
Topical Sulfur Applications Pregnancy And Lactation Text: This medication is Pregnancy Category C and has an unknown safety profile during pregnancy. It is unknown if this topical medication is excreted in breast milk.
Hyrimoz Counseling:  I discussed with the patient the risks of adalimumab including but not limited to myelosuppression, immunosuppression, autoimmune hepatitis, demyelinating diseases, lymphoma, and serious infections.  The patient understands that monitoring is required including a PPD at baseline and must alert us or the primary physician if symptoms of infection or other concerning signs are noted.
Colchicine Counseling:  Patient counseled regarding adverse effects including but not limited to stomach upset (nausea, vomiting, stomach pain, or diarrhea).  Patient instructed to limit alcohol consumption while taking this medication.  Colchicine may reduce blood counts especially with prolonged use.  The patient understands that monitoring of kidney function and blood counts may be required, especially at baseline. The patient verbalized understanding of the proper use and possible adverse effects of colchicine.  All of the patient's questions and concerns were addressed.
Spironolactone Counseling: Patient advised regarding risks of diarrhea, abdominal pain, hyperkalemia, birth defects (for female patients), liver toxicity and renal toxicity. The patient may need blood work to monitor liver and kidney function and potassium levels while on therapy. The patient verbalized understanding of the proper use and possible adverse effects of spironolactone.  All of the patient's questions and concerns were addressed.
Methotrexate Pregnancy And Lactation Text: This medication is Pregnancy Category X and is known to cause fetal harm. This medication is excreted in breast milk.
Topical Clindamycin Counseling: Patient counseled that this medication may cause skin irritation or allergic reactions.  In the event of skin irritation, the patient was advised to reduce the amount of the drug applied or use it less frequently.   The patient verbalized understanding of the proper use and possible adverse effects of clindamycin.  All of the patient's questions and concerns were addressed.
Low Dose Naltrexone Counseling- I discussed with the patient the potential risks and side effects of low dose naltrexone including but not limited to: more vivid dreams, headaches, nausea, vomiting, abdominal pain, fatigue, dizziness, and anxiety.
Cimzia Pregnancy And Lactation Text: This medication crosses the placenta but can be considered safe in certain situations. Cimzia may be excreted in breast milk.
Doxycycline Counseling:  Patient counseled regarding possible photosensitivity and increased risk for sunburn.  Patient instructed to avoid sunlight, if possible.  When exposed to sunlight, patients should wear protective clothing, sunglasses, and sunscreen.  The patient was instructed to call the office immediately if the following severe adverse effects occur:  hearing changes, easy bruising/bleeding, severe headache, or vision changes.  The patient verbalized understanding of the proper use and possible adverse effects of doxycycline.  All of the patient's questions and concerns were addressed.
Albendazole Counseling:  I discussed with the patient the risks of albendazole including but not limited to cytopenia, kidney damage, nausea/vomiting and severe allergy.  The patient understands that this medication is being used in an off-label manner.
Bexarotene Counseling:  I discussed with the patient the risks of bexarotene including but not limited to hair loss, dry lips/skin/eyes, liver abnormalities, hyperlipidemia, pancreatitis, depression/suicidal ideation, photosensitivity, drug rash/allergic reactions, hypothyroidism, anemia, leukopenia, infection, cataracts, and teratogenicity.  Patient understands that they will need regular blood tests to check lipid profile, liver function tests, white blood cell count, thyroid function tests and pregnancy test if applicable.
Cimetidine Counseling:  I discussed with the patient the risks of Cimetidine including but not limited to gynecomastia, headache, diarrhea, nausea, drowsiness, arrhythmias, pancreatitis, skin rashes, psychosis, bone marrow suppression and kidney toxicity.
Picato Counseling:  I discussed with the patient the risks of Picato including but not limited to erythema, scaling, itching, weeping, crusting, and pain.
Imiquimod Pregnancy And Lactation Text: This medication is Pregnancy Category C. It is unknown if this medication is excreted in breast milk.
Oral Minoxidil Pregnancy And Lactation Text: This medication should only be used when clearly needed if you are pregnant, attempting to become pregnant or breast feeding.
Tranexamic Acid Pregnancy And Lactation Text: It is unknown if this medication is safe during pregnancy or breast feeding.
Wartpeel Counseling:  I discussed with the patient the risks of Wartpeel including but not limited to erythema, scaling, itching, weeping, crusting, and pain.
Azelaic Acid Pregnancy And Lactation Text: This medication is considered safe during pregnancy and breast feeding.
Rinvoq Counseling: I discussed with the patient the risks of Rinvoq therapy including but not limited to upper respiratory tract infections, shingles, cold sores, bronchitis, nausea, cough, fever, acne, and headache. Live vaccines should be avoided.  This medication has been linked to serious infections; higher rate of mortality; malignancy and lymphoproliferative disorders; major adverse cardiovascular events; thrombosis; thrombocytopenia, anemia, and neutropenia; lipid elevations; liver enzyme elevations; and gastrointestinal perforations.
Drysol Counseling:  I discussed with the patient the risks of drysol/aluminum chloride including but not limited to skin rash, itching, irritation, burning.
Rhofade Pregnancy And Lactation Text: This medication has not been assigned a Pregnancy Risk Category. It is unknown if the medication is excreted in breast milk.
Cosentyx Counseling:  I discussed with the patient the risks of Cosentyx including but not limited to worsening of Crohn's disease, immunosuppression, allergic reactions and infections.  The patient understands that monitoring is required including a PPD at baseline and must alert us or the primary physician if symptoms of infection or other concerning signs are noted.
Dutasteride Male Counseling: Dustasteride Counseling:  I discussed with the patient the risks of use of dutasteride including but not limited to decreased libido, decreased ejaculate volume, and gynecomastia. Women who can become pregnant should not handle medication.  All of the patient's questions and concerns were addressed.
Bexarotene Pregnancy And Lactation Text: This medication is Pregnancy Category X and should not be given to women who are pregnant or may become pregnant. This medication should not be used if you are breast feeding.
Albendazole Pregnancy And Lactation Text: This medication is Pregnancy Category C and it isn't known if it is safe during pregnancy. It is also excreted in breast milk.
Prednisone Counseling:  I discussed with the patient the risks of prolonged use of prednisone including but not limited to weight gain, insomnia, osteoporosis, mood changes, diabetes, susceptibility to infection, glaucoma and high blood pressure.  In cases where prednisone use is prolonged, patients should be monitored with blood pressure checks, serum glucose levels and an eye exam.  Additionally, the patient may need to be placed on GI prophylaxis, PCP prophylaxis, and calcium and vitamin D supplementation and/or a bisphosphonate.  The patient verbalized understanding of the proper use and the possible adverse effects of prednisone.  All of the patient's questions and concerns were addressed.
Griseofulvin Counseling:  I discussed with the patient the risks of griseofulvin including but not limited to photosensitivity, cytopenia, liver damage, nausea/vomiting and severe allergy.  The patient understands that this medication is best absorbed when taken with a fatty meal (e.g., ice cream or french fries).
Simponi Counseling:  I discussed with the patient the risks of golimumab including but not limited to myelosuppression, immunosuppression, autoimmune hepatitis, demyelinating diseases, lymphoma, and serious infections.  The patient understands that monitoring is required including a PPD at baseline and must alert us or the primary physician if symptoms of infection or other concerning signs are noted.
Doxycycline Pregnancy And Lactation Text: This medication is Pregnancy Category D and not consider safe during pregnancy. It is also excreted in breast milk but is considered safe for shorter treatment courses.
Rifampin Counseling: I discussed with the patient the risks of rifampin including but not limited to liver damage, kidney damage, red-orange body fluids, nausea/vomiting and severe allergy.
Otezla Counseling: The side effects of Otezla were discussed with the patient, including but not limited to worsening or new depression, weight loss, diarrhea, nausea, upper respiratory tract infection, and headache. Patient instructed to call the office should any adverse effect occur.  The patient verbalized understanding of the proper use and possible adverse effects of Otezla.  All the patient's questions and concerns were addressed.
Azithromycin Counseling:  I discussed with the patient the risks of azithromycin including but not limited to GI upset, allergic reaction, drug rash, diarrhea, and yeast infections.
Low Dose Naltrexone Pregnancy And Lactation Text: Naltrexone is pregnancy category C.  There have been no adequate and well-controlled studies in pregnant women.  It should be used in pregnancy only if the potential benefit justifies the potential risk to the fetus.   Limited data indicates that naltrexone is minimally excreted into breastmilk.
Topical Clindamycin Pregnancy And Lactation Text: This medication is Pregnancy Category B and is considered safe during pregnancy. It is unknown if it is excreted in breast milk.
Solaraze Counseling:  I discussed with the patient the risks of Solaraze including but not limited to erythema, scaling, itching, weeping, crusting, and pain.
Dapsone Counseling: I discussed with the patient the risks of dapsone including but not limited to hemolytic anemia, agranulocytosis, rashes, methemoglobinemia, kidney failure, peripheral neuropathy, headaches, GI upset, and liver toxicity.  Patients who start dapsone require monitoring including baseline LFTs and weekly CBCs for the first month, then every month thereafter.  The patient verbalized understanding of the proper use and possible adverse effects of dapsone.  All of the patient's questions and concerns were addressed.
Valtrex Counseling: I discussed with the patient the risks of valacyclovir including but not limited to kidney damage, nausea, vomiting and severe allergy.  The patient understands that if the infection seems to be worsening or is not improving, they are to call.
Xolair Counseling:  Patient informed of potential adverse effects including but not limited to fever, muscle aches, rash and allergic reactions.  The patient verbalized understanding of the proper use and possible adverse effects of Xolair.  All of the patient's questions and concerns were addressed.
Klisyri Counseling:  I discussed with the patient the risks of Klisyri including but not limited to erythema, scaling, itching, weeping, crusting, and pain.
Benzoyl Peroxide Counseling: Patient counseled that medicine may cause skin irritation and bleach clothing.  In the event of skin irritation, the patient was advised to reduce the amount of the drug applied or use it less frequently.   The patient verbalized understanding of the proper use and possible adverse effects of benzoyl peroxide.  All of the patient's questions and concerns were addressed.
Spironolactone Pregnancy And Lactation Text: This medication can cause feminization of the male fetus and should be avoided during pregnancy. The active metabolite is also found in breast milk.
Rinvoq Pregnancy And Lactation Text: Based on animal studies, Rinvoq may cause embryo-fetal harm when administered to pregnant women.  The medication should not be used in pregnancy.  Breastfeeding is not recommended during treatment and for 6 days after the last dose.
Doxepin Counseling:  Patient advised that the medication is sedating and not to drive a car after taking this medication. Patient informed of potential adverse effects including but not limited to dry mouth, urinary retention, and blurry vision.  The patient verbalized understanding of the proper use and possible adverse effects of doxepin.  All of the patient's questions and concerns were addressed.
Ivermectin Counseling:  Patient instructed to take medication on an empty stomach with a full glass of water.  Patient informed of potential adverse effects including but not limited to nausea, diarrhea, dizziness, itching, and swelling of the extremities or lymph nodes.  The patient verbalized understanding of the proper use and possible adverse effects of ivermectin.  All of the patient's questions and concerns were addressed.
Ilumya Counseling: I discussed with the patient the risks of tildrakizumab including but not limited to immunosuppression, malignancy, posterior leukoencephalopathy syndrome, and serious infections.  The patient understands that monitoring is required including a PPD at baseline and must alert us or the primary physician if symptoms of infection or other concerning signs are noted.
Erythromycin Counseling:  I discussed with the patient the risks of erythromycin including but not limited to GI upset, allergic reaction, drug rash, diarrhea, increase in liver enzymes, and yeast infections.
Isotretinoin Counseling: Patient should get monthly blood tests, not donate blood, not drive at night if vision affected, not share medication, and not undergo elective surgery for 6 months after tx completed. Side effects reviewed, pt to contact office should one occur.
Griseofulvin Pregnancy And Lactation Text: This medication is Pregnancy Category X and is known to cause serious birth defects. It is unknown if this medication is excreted in breast milk but breast feeding should be avoided.
Otezla Pregnancy And Lactation Text: This medication is Pregnancy Category C and it isn't known if it is safe during pregnancy. It is unknown if it is excreted in breast milk.
Rifampin Pregnancy And Lactation Text: This medication is Pregnancy Category C and it isn't know if it is safe during pregnancy. It is also excreted in breast milk and should not be used if you are breast feeding.
Topical Ketoconazole Counseling: Patient counseled that this medication may cause skin irritation or allergic reactions.  In the event of skin irritation, the patient was advised to reduce the amount of the drug applied or use it less frequently.   The patient verbalized understanding of the proper use and possible adverse effects of ketoconazole.  All of the patient's questions and concerns were addressed.
Benzoyl Peroxide Pregnancy And Lactation Text: This medication is Pregnancy Category C. It is unknown if benzoyl peroxide is excreted in breast milk.
Solaraze Pregnancy And Lactation Text: This medication is Pregnancy Category B and is considered safe. There is some data to suggest avoiding during the third trimester. It is unknown if this medication is excreted in breast milk.
Dapsone Pregnancy And Lactation Text: This medication is Pregnancy Category C and is not considered safe during pregnancy or breast feeding.
Protopic Counseling: Patient may experience a mild burning sensation during topical application. Protopic is not approved in children less than 2 years of age. There have been case reports of hematologic and skin malignancies in patients using topical calcineurin inhibitors although causality is questionable.
Opioid Counseling: I discussed with the patient the potential side effects of opioids including but not limited to addiction, altered mental status, and depression. I stressed avoiding alcohol, benzodiazepines, muscle relaxants and sleep aids unless specifically okayed by a physician. The patient verbalized understanding of the proper use and possible adverse effects of opioids. All of the patient's questions and concerns were addressed. They were instructed to flush the remaining pills down the toilet if they did not need them for pain.
Xolair Pregnancy And Lactation Text: This medication is Pregnancy Category B and is considered safe during pregnancy. This medication is excreted in breast milk.
Azithromycin Pregnancy And Lactation Text: This medication is considered safe during pregnancy and is also secreted in breast milk.
Klisyri Pregnancy And Lactation Text: It is unknown if this medication can harm a developing fetus or if it is excreted in breast milk.
Dutasteride Female Counseling: Dutasteride Counseling:  I discussed with the patient the risks of use of dutasteride including but not limited to decreased libido and sexual dysfunction. Explained the teratogenic nature of the medication and stressed the importance of not getting pregnant during treatment. All of the patient's questions and concerns were addressed.
Itraconazole Counseling:  I discussed with the patient the risks of itraconazole including but not limited to liver damage, nausea/vomiting, neuropathy, and severe allergy.  The patient understands that this medication is best absorbed when taken with acidic beverages such as non-diet cola or ginger ale.  The patient understands that monitoring is required including baseline LFTs and repeat LFTs at intervals.  The patient understands that they are to contact us or the primary physician if concerning signs are noted.
Azathioprine Counseling:  I discussed with the patient the risks of azathioprine including but not limited to myelosuppression, immunosuppression, hepatotoxicity, lymphoma, and infections.  The patient understands that monitoring is required including baseline LFTs, Creatinine, possible TPMP genotyping and weekly CBCs for the first month and then every 2 weeks thereafter.  The patient verbalized understanding of the proper use and possible adverse effects of azathioprine.  All of the patient's questions and concerns were addressed.
Doxepin Pregnancy And Lactation Text: This medication is Pregnancy Category C and it isn't known if it is safe during pregnancy. It is also excreted in breast milk and breast feeding isn't recommended.
Elidel Counseling: Patient may experience a mild burning sensation during topical application. Elidel is not approved in children less than 2 years of age. There have been case reports of hematologic and skin malignancies in patients using topical calcineurin inhibitors although causality is questionable.
Sotyktu Counseling:  I discussed the most common side effects of Sotyktu including: common cold, sore throat, sinus infections, cold sores, canker sores, folliculitis, and acne.  I also discussed more serious side effects of Sotyktu including but not limited to: serious allergic reactions; increased risk for infections such as TB; cancers such as lymphomas; rhabdomyolysis and elevated CPK; and elevated triglycerides and liver enzymes. 
Valtrex Pregnancy And Lactation Text: this medication is Pregnancy Category B and is considered safe during pregnancy. This medication is not directly found in breast milk but it's metabolite acyclovir is present.
Niacinamide Counseling: I recommended taking niacin or niacinamide, also know as vitamin B3, twice daily. Recent evidence suggests that taking vitamin B3 (500 mg twice daily) can reduce the risk of actinic keratoses and non-melanoma skin cancers. Side effects of vitamin B3 include flushing and headache.
Propranolol Counseling:  I discussed with the patient the risks of propranolol including but not limited to low heart rate, low blood pressure, low blood sugar, restlessness and increased cold sensitivity. They should call the office if they experience any of these side effects.
Erythromycin Pregnancy And Lactation Text: This medication is Pregnancy Category B and is considered safe during pregnancy. It is also excreted in breast milk.
Isotretinoin Pregnancy And Lactation Text: This medication is Pregnancy Category X and is considered extremely dangerous during pregnancy. It is unknown if it is excreted in breast milk.
Skyrizi Counseling: I discussed with the patient the risks of risankizumab-rzaa including but not limited to immunosuppression, and serious infections.  The patient understands that monitoring is required including a PPD at baseline and must alert us or the primary physician if symptoms of infection or other concerning signs are noted.
Minoxidil Counseling: Minoxidil is a topical medication which can increase blood flow where it is applied. It is uncertain how this medication increases hair growth. Side effects are uncommon and include stinging and allergic reactions.
Bactrim Counseling:  I discussed with the patient the risks of sulfa antibiotics including but not limited to GI upset, allergic reaction, drug rash, diarrhea, dizziness, photosensitivity, and yeast infections.  Rarely, more serious reactions can occur including but not limited to aplastic anemia, agranulocytosis, methemoglobinemia, blood dyscrasias, liver or kidney failure, lung infiltrates or desquamative/blistering drug rashes.
Winlevi Counseling:  I discussed with the patient the risks of topical clascoterone including but not limited to erythema, scaling, itching, and stinging. Patient voiced their understanding.
Oxybutynin Counseling:  I discussed with the patient the risks of oxybutynin including but not limited to skin rash, drowsiness, dry mouth, difficulty urinating, and blurred vision.
Protopic Pregnancy And Lactation Text: This medication is Pregnancy Category C. It is unknown if this medication is excreted in breast milk when applied topically.
Sarecycline Counseling: Patient advised regarding possible photosensitivity and discoloration of the teeth, skin, lips, tongue and gums.  Patient instructed to avoid sunlight, if possible.  When exposed to sunlight, patients should wear protective clothing, sunglasses, and sunscreen.  The patient was instructed to call the office immediately if the following severe adverse effects occur:  hearing changes, easy bruising/bleeding, severe headache, or vision changes.  The patient verbalized understanding of the proper use and possible adverse effects of sarecycline.  All of the patient's questions and concerns were addressed.
Dupixent Counseling: I discussed with the patient the risks of dupilumab including but not limited to eye infection and irritation, cold sores, injection site reactions, worsening of asthma, allergic reactions and increased risk of parasitic infection.  Live vaccines should be avoided while taking dupilumab. Dupilumab will also interact with certain medications such as warfarin and cyclosporine. The patient understands that monitoring is required and they must alert us or the primary physician if symptoms of infection or other concerning signs are noted.
Niacinamide Pregnancy And Lactation Text: These medications are considered safe during pregnancy.
Carac Counseling:  I discussed with the patient the risks of Carac including but not limited to erythema, scaling, itching, weeping, crusting, and pain.
Gabapentin Counseling: I discussed with the patient the risks of gabapentin including but not limited to dizziness, somnolence, fatigue and ataxia.
Cibinqo Counseling: I discussed with the patient the risks of Cibinqo therapy including but not limited to common cold, nausea, headache, cold sores, increased blood CPK levels, dizziness, UTIs, fatigue, acne, and vomitting. Live vaccines should be avoided.  This medication has been linked to serious infections; higher rate of mortality; malignancy and lymphoproliferative disorders; major adverse cardiovascular events; thrombosis; thrombocytopenia and lymphopenia; lipid elevations; and retinal detachment.
Opioid Pregnancy And Lactation Text: These medications can lead to premature delivery and should be avoided during pregnancy. These medications are also present in breast milk in small amounts.
Soolantra Counseling: I discussed with the patients the risks of topial Soolantra. This is a medicine which decreases the number of mites and inflammation in the skin. You experience burning, stinging, eye irritation or allergic reactions.  Please call our office if you develop any problems from using this medication.
Propranolol Pregnancy And Lactation Text: This medication is Pregnancy Category C and it isn't known if it is safe during pregnancy. It is excreted in breast milk.
Azathioprine Pregnancy And Lactation Text: This medication is Pregnancy Category D and isn't considered safe during pregnancy. It is unknown if this medication is excreted in breast milk.
Sotyktu Pregnancy And Lactation Text: There is insufficient data to evaluate whether or not Sotyktu is safe to use during pregnancy.   It is not known if Sotyktu passes into breast milk and whether or not it is safe to use when breastfeeding.  
High Dose Vitamin A Counseling: Side effects reviewed, pt to contact office should one occur.
Dutasteride Pregnancy And Lactation Text: This medication is absolutely contraindicated in women, especially during pregnancy and breast feeding. Feminization of male fetuses is possible if taking while pregnant.
Infliximab Counseling:  I discussed with the patient the risks of infliximab including but not limited to myelosuppression, immunosuppression, autoimmune hepatitis, demyelinating diseases, lymphoma, and serious infections.  The patient understands that monitoring is required including a PPD at baseline and must alert us or the primary physician if symptoms of infection or other concerning signs are noted.
Topical Metronidazole Counseling: Metronidazole is a topical antibiotic medication. You may experience burning, stinging, redness, or allergic reactions.  Please call our office if you develop any problems from using this medication.
Dupixent Pregnancy And Lactation Text: This medication likely crosses the placenta but the risk for the fetus is uncertain. This medication is excreted in breast milk.
Bactrim Pregnancy And Lactation Text: This medication is Pregnancy Category D and is known to cause fetal risk.  It is also excreted in breast milk.
Metronidazole Counseling:  I discussed with the patient the risks of metronidazole including but not limited to seizures, nausea/vomiting, a metallic taste in the mouth, nausea/vomiting and severe allergy.
Erivedge Counseling- I discussed with the patient the risks of Erivedge including but not limited to nausea, vomiting, diarrhea, constipation, weight loss, changes in the sense of taste, decreased appetite, muscle spasms, and hair loss.  The patient verbalized understanding of the proper use and possible adverse effects of Erivedge.  All of the patient's questions and concerns were addressed.
Hydroxyzine Counseling: Patient advised that the medication is sedating and not to drive a car after taking this medication.  Patient informed of potential adverse effects including but not limited to dry mouth, urinary retention, and blurry vision.  The patient verbalized understanding of the proper use and possible adverse effects of hydroxyzine.  All of the patient's questions and concerns were addressed.
Sarecycline Pregnancy And Lactation Text: This medication is Pregnancy Category D and not consider safe during pregnancy. It is also excreted in breast milk.
Nsaids Counseling: NSAID Counseling: I discussed with the patient that NSAIDs should be taken with food. Prolonged use of NSAIDs can result in the development of stomach ulcers.  Patient advised to stop taking NSAIDs if abdominal pain occurs.  The patient verbalized understanding of the proper use and possible adverse effects of NSAIDs.  All of the patient's questions and concerns were addressed.
Adbry Counseling: I discussed with the patient the risks of tralokinumab including but not limited to eye infection and irritation, cold sores, injection site reactions, worsening of asthma, allergic reactions and increased risk of parasitic infection.  Live vaccines should be avoided while taking tralokinumab. The patient understands that monitoring is required and they must alert us or the primary physician if symptoms of infection or other concerning signs are noted.
Soolantra Pregnancy And Lactation Text: This medication is Pregnancy Category C. This medication is considered safe during breast feeding.
Winlevi Pregnancy And Lactation Text: This medication is considered safe during pregnancy and breastfeeding.
Xeljanz Counseling: I discussed with the patient the risks of Xeljanz therapy including increased risk of infection, liver issues, headache, diarrhea, or cold symptoms. Live vaccines should be avoided. They were instructed to call if they have any problems.
Stelara Counseling:  I discussed with the patient the risks of ustekinumab including but not limited to immunosuppression, malignancy, posterior leukoencephalopathy syndrome, and serious infections.  The patient understands that monitoring is required including a PPD at baseline and must alert us or the primary physician if symptoms of infection or other concerning signs are noted.
High Dose Vitamin A Pregnancy And Lactation Text: High dose vitamin A therapy is contraindicated during pregnancy and breast feeding.
Cellcept Counseling:  I discussed with the patient the risks of mycophenolate mofetil including but not limited to infection/immunosuppression, GI upset, hypokalemia, hypercholesterolemia, bone marrow suppression, lymphoproliferative disorders, malignancy, GI ulceration/bleed/perforation, colitis, interstitial lung disease, kidney failure, progressive multifocal leukoencephalopathy, and birth defects.  The patient understands that monitoring is required including a baseline creatinine and regular CBC testing. In addition, patient must alert us immediately if symptoms of infection or other concerning signs are noted.
Finasteride Male Counseling: Finasteride Counseling:  I discussed with the patient the risks of use of finasteride including but not limited to decreased libido, decreased ejaculate volume, gynecomastia, and depression. Women should not handle medication.  All of the patient's questions and concerns were addressed.
Cibinqo Pregnancy And Lactation Text: It is unknown if this medication will adversely affect pregnancy or breast feeding.  You should not take this medication if you are currently pregnant or planning a pregnancy or while breastfeeding.
Eucrisa Counseling: Patient may experience a mild burning sensation during topical application. Eucrisa is not approved in children less than 2 years of age.
Ketoconazole Counseling:   Patient counseled regarding improving absorption with orange juice.  Adverse effects include but are not limited to breast enlargement, headache, diarrhea, nausea, upset stomach, liver function test abnormalities, taste disturbance, and stomach pain.  There is a rare possibility of liver failure that can occur when taking ketoconazole. The patient understands that monitoring of LFTs may be required, especially at baseline. The patient verbalized understanding of the proper use and possible adverse effects of ketoconazole.  All of the patient's questions and concerns were addressed.
Metronidazole Pregnancy And Lactation Text: This medication is Pregnancy Category B and considered safe during pregnancy.  It is also excreted in breast milk.
Enbrel Counseling:  I discussed with the patient the risks of etanercept including but not limited to myelosuppression, immunosuppression, autoimmune hepatitis, demyelinating diseases, lymphoma, and infections.  The patient understands that monitoring is required including a PPD at baseline and must alert us or the primary physician if symptoms of infection or other concerning signs are noted.
Tetracycline Counseling: Patient counseled regarding possible photosensitivity and increased risk for sunburn.  Patient instructed to avoid sunlight, if possible.  When exposed to sunlight, patients should wear protective clothing, sunglasses, and sunscreen.  The patient was instructed to call the office immediately if the following severe adverse effects occur:  hearing changes, easy bruising/bleeding, severe headache, or vision changes.  The patient verbalized understanding of the proper use and possible adverse effects of tetracycline.  All of the patient's questions and concerns were addressed. Patient understands to avoid pregnancy while on therapy due to potential birth defects.
SSKI Counseling:  I discussed with the patient the risks of SSKI including but not limited to thyroid abnormalities, metallic taste, GI upset, fever, headache, acne, arthralgias, paraesthesias, lymphadenopathy, easy bleeding, arrhythmias, and allergic reaction.
Hydroxyzine Pregnancy And Lactation Text: This medication is not safe during pregnancy and should not be taken. It is also excreted in breast milk and breast feeding isn't recommended.
Arava Counseling:  Patient counseled regarding adverse effects of Arava including but not limited to nausea, vomiting, abnormalities in liver function tests. Patients may develop mouth sores, rash, diarrhea, and abnormalities in blood counts. The patient understands that monitoring is required including LFTs and blood counts.  There is a rare possibility of scarring of the liver and lung problems that can occur when taking methotrexate. Persistent nausea, loss of appetite, pale stools, dark urine, cough, and shortness of breath should be reported immediately. Patient advised to discontinue Arava treatment and consult with a physician prior to attempting conception. The patient will have to undergo a treatment to eliminate Arava from the body prior to conception.
Topical Retinoid counseling:  Patient advised to apply a pea-sized amount only at bedtime and wait 30 minutes after washing their face before applying.  If too drying, patient may add a non-comedogenic moisturizer. The patient verbalized understanding of the proper use and possible adverse effects of retinoids.  All of the patient's questions and concerns were addressed.
Glycopyrrolate Counseling:  I discussed with the patient the risks of glycopyrrolate including but not limited to skin rash, drowsiness, dry mouth, difficulty urinating, and blurred vision.
Cyclophosphamide Pregnancy And Lactation Text: This medication is Pregnancy Category D and it isn't considered safe during pregnancy. This medication is excreted in breast milk.
Cephalexin Counseling: I counseled the patient regarding use of cephalexin as an antibiotic for prophylactic and/or therapeutic purposes. Cephalexin (commonly prescribed under brand name Keflex) is a cephalosporin antibiotic which is active against numerous classes of bacteria, including most skin bacteria. Side effects may include nausea, diarrhea, gastrointestinal upset, rash, hives, yeast infections, and in rare cases, hepatitis, kidney disease, seizures, fever, confusion, neurologic symptoms, and others. Patients with severe allergies to penicillin medications are cautioned that there is about a 10% incidence of cross-reactivity with cephalosporins. When possible, patients with penicillin allergies should use alternatives to cephalosporins for antibiotic therapy.
VTAMA Counseling: I discussed with the patient that VTAMA is not for use in the eyes, mouth or mouth. They should call the office if they develop any signs of allergic reactions to VTAMA. The patient verbalized understanding of the proper use and possible adverse effects of VTAMA.  All of the patient's questions and concerns were addressed.
Mirvaso Counseling: Mirvaso is a topical medication which can decrease superficial blood flow where applied. Side effects are uncommon and include stinging, redness and allergic reactions.
Topical Metronidazole Pregnancy And Lactation Text: This medication is Pregnancy Category B and considered safe during pregnancy.  It is also considered safe to use while breastfeeding.
Litfulo Counseling: I discussed with the patient the risks of Litfulo therapy including but not limited to upper respiratory tract infections, shingles, cold sores, and nausea. Live vaccines should be avoided.  This medication has been linked to serious infections; higher rate of mortality; malignancy and lymphoproliferative disorders; major adverse cardiovascular events; thrombosis; gastrointestinal perforations; neutropenia; lymphopenia; anemia; liver enzyme elevations; and lipid elevations.
Nsaids Pregnancy And Lactation Text: These medications are considered safe up to 30 weeks gestation. It is excreted in breast milk.
Finasteride Female Counseling: Finasteride Counseling:  I discussed with the patient the risks of use of finasteride including but not limited to decreased libido and sexual dysfunction. Explained the teratogenic nature of the medication and stressed the importance of not getting pregnant during treatment. All of the patient's questions and concerns were addressed.
Xelharryz Pregnancy And Lactation Text: This medication is Pregnancy Category D and is not considered safe during pregnancy.  The risk during breast feeding is also uncertain.
Adbry Pregnancy And Lactation Text: It is unknown if this medication will adversely affect pregnancy or breast feeding.
Minocycline Counseling: Patient advised regarding possible photosensitivity and discoloration of the teeth, skin, lips, tongue and gums.  Patient instructed to avoid sunlight, if possible.  When exposed to sunlight, patients should wear protective clothing, sunglasses, and sunscreen.  The patient was instructed to call the office immediately if the following severe adverse effects occur:  hearing changes, easy bruising/bleeding, severe headache, or vision changes.  The patient verbalized understanding of the proper use and possible adverse effects of minocycline.  All of the patient's questions and concerns were addressed.
Ketoconazole Pregnancy And Lactation Text: This medication is Pregnancy Category C and it isn't know if it is safe during pregnancy. It is also excreted in breast milk and breast feeding isn't recommended.
Sski Pregnancy And Lactation Text: This medication is Pregnancy Category D and isn't considered safe during pregnancy. It is excreted in breast milk.
Rituxan Counseling:  I discussed with the patient the risks of Rituxan infusions. Side effects can include infusion reactions, severe drug rashes including mucocutaneous reactions, reactivation of latent hepatitis and other infections and rarely progressive multifocal leukoencephalopathy.  All of the patient's questions and concerns were addressed.
Topical Steroids Counseling: I discussed with the patient that prolonged use of topical steroids can result in the increased appearance of superficial blood vessels (telangiectasias), lightening (hypopigmentation) and thinning of the skin (atrophy).  Patient understands to avoid using high potency steroids in skin folds, the groin or the face.  The patient verbalized understanding of the proper use and possible adverse effects of topical steroids.  All of the patient's questions and concerns were addressed.
Calcipotriene Counseling:  I discussed with the patient the risks of calcipotriene including but not limited to erythema, scaling, itching, and irritation.
Libtayo Counseling- I discussed with the patient the risks of Libtayo including but not limited to nausea, vomiting, diarrhea, and bone or muscle pain.  The patient verbalized understanding of the proper use and possible adverse effects of Libtayo.  All of the patient's questions and concerns were addressed.
Finasteride Pregnancy And Lactation Text: This medication is absolutely contraindicated during pregnancy. It is unknown if it is excreted in breast milk.
Cyclosporine Counseling:  I discussed with the patient the risks of cyclosporine including but not limited to hypertension, gingival hyperplasia,myelosuppression, immunosuppression, liver damage, kidney damage, neurotoxicity, lymphoma, and serious infections. The patient understands that monitoring is required including baseline blood pressure, CBC, CMP, lipid panel and uric acid, and then 1-2 times monthly CMP and blood pressure.
Detail Level: Detailed
Bimzelx Counseling:  I discussed with the patient the risks of Bimzelx including but not limited to depression, immunosuppression, allergic reactions and infections.  The patient understands that monitoring is required including a PPD at baseline and must alert us or the primary physician if symptoms of infection or other concerning signs are noted.
Calcipotriene Pregnancy And Lactation Text: The use of this medication during pregnancy or lactation is not recommended as there is insufficient data.
Litfulo Pregnancy And Lactation Text: Based on animal studies, Lifulo may cause embryo-fetal harm when administered to pregnant women.  The medication should not be used in pregnancy.  Breastfeeding is not recommended during treatment.
Cephalexin Pregnancy And Lactation Text: This medication is Pregnancy Category B and considered safe during pregnancy.  It is also excreted in breast milk but can be used safely for shorter doses.
Glycopyrrolate Pregnancy And Lactation Text: This medication is Pregnancy Category B and is considered safe during pregnancy. It is unknown if it is excreted breast milk.
Qbrexza Counseling:  I discussed with the patient the risks of Qbrexza including but not limited to headache, mydriasis, blurred vision, dry eyes, nasal dryness, dry mouth, dry throat, dry skin, urinary hesitation, and constipation.  Local skin reactions including erythema, burning, stinging, and itching can also occur.
Thalidomide Counseling: I discussed with the patient the risks of thalidomide including but not limited to birth defects, anxiety, weakness, chest pain, dizziness, cough and severe allergy.
Terbinafine Counseling: Patient counseling regarding adverse effects of terbinafine including but not limited to headache, diarrhea, rash, upset stomach, liver function test abnormalities, itching, taste/smell disturbance, nausea, abdominal pain, and flatulence.  There is a rare possibility of liver failure that can occur when taking terbinafine.  The patient understands that a baseline LFT and kidney function test may be required. The patient verbalized understanding of the proper use and possible adverse effects of terbinafine.  All of the patient's questions and concerns were addressed.
Clofazimine Counseling:  I discussed with the patient the risks of clofazimine including but not limited to skin and eye pigmentation, liver damage, nausea/vomiting, gastrointestinal bleeding and allergy.
Olanzapine Counseling- I discussed with the patient the common side effects of olanzapine including but are not limited to: lack of energy, dry mouth, increased appetite, sleepiness, tremor, constipation, dizziness, changes in behavior, or restlessness.  Explained that teenagers are more likely to experience headaches, abdominal pain, pain in the arms or legs, tiredness, and sleepiness.  Serious side effects include but are not limited: increased risk of death in elderly patients who are confused, have memory loss, or dementia-related psychosis; hyperglycemia; increased cholesterol and triglycerides; and weight gain.
Hydroquinone Counseling:  Patient advised that medication may result in skin irritation, lightening (hypopigmentation), dryness, and burning.  In the event of skin irritation, the patient was advised to reduce the amount of the drug applied or use it less frequently.  Rarely, spots that are treated with hydroquinone can become darker (pseudoochronosis).  Should this occur, patient instructed to stop medication and call the office. The patient verbalized understanding of the proper use and possible adverse effects of hydroquinone.  All of the patient's questions and concerns were addressed.
Cyclophosphamide Counseling:  I discussed with the patient the risks of cyclophosphamide including but not limited to hair loss, hormonal abnormalities, decreased fertility, abdominal pain, diarrhea, nausea and vomiting, bone marrow suppression and infection. The patient understands that monitoring is required while taking this medication.
Rituxan Pregnancy And Lactation Text: This medication is Pregnancy Category C and it isn't know if it is safe during pregnancy. It is unknown if this medication is excreted in breast milk but similar antibodies are known to be excreted.
Topical Steroids Applications Pregnancy And Lactation Text: Most topical steroids are considered safe to use during pregnancy and lactation.  Any topical steroid applied to the breast or nipple should be washed off before breastfeeding.
Humira Counseling:  I discussed with the patient the risks of adalimumab including but not limited to myelosuppression, immunosuppression, autoimmune hepatitis, demyelinating diseases, lymphoma, and serious infections.  The patient understands that monitoring is required including a PPD at baseline and must alert us or the primary physician if symptoms of infection or other concerning signs are noted.
Acitretin Counseling:  I discussed with the patient the risks of acitretin including but not limited to hair loss, dry lips/skin/eyes, liver damage, hyperlipidemia, depression/suicidal ideation, photosensitivity.  Serious rare side effects can include but are not limited to pancreatitis, pseudotumor cerebri, bony changes, clot formation/stroke/heart attack.  Patient understands that alcohol is contraindicated since it can result in liver toxicity and significantly prolong the elimination of the drug by many years.
Libtayo Pregnancy And Lactation Text: This medication is contraindicated in pregnancy and when breast feeding.
Aklief counseling:  Patient advised to apply a pea-sized amount only at bedtime and wait 30 minutes after washing their face before applying.  If too drying, patient may add a non-comedogenic moisturizer.  The most commonly reported side effects including irritation, redness, scaling, dryness, stinging, burning, itching, and increased risk of sunburn.  The patient verbalized understanding of the proper use and possible adverse effects of retinoids.  All of the patient's questions and concerns were addressed.
Cantharidin Counseling:  I discussed with the patient the risks of Cantharidin including but not limited to pain, redness, burning, itching, and blistering.
Taltz Counseling: I discussed with the patient the risks of ixekizumab including but not limited to immunosuppression, serious infections, worsening of inflammatory bowel disease and drug reactions.  The patient understands that monitoring is required including a PPD at baseline and must alert us or the primary physician if symptoms of infection or other concerning signs are noted.

## 2024-04-03 NOTE — PROCEDURE: URINE PREGNANCY TEST
Performed By: Aurora Desouza MA. Preg. test controls validated.
Detail Level: None
Urine Pregnancy Test Result: negative
Expiration Date (Optional): 1/31/25
Lot # (Optional): [id number]

## 2024-05-06 ENCOUNTER — APPOINTMENT (RX ONLY)
Dept: URBAN - METROPOLITAN AREA CLINIC 35 | Facility: CLINIC | Age: 24
Setting detail: DERMATOLOGY
End: 2024-05-06

## 2024-05-06 DIAGNOSIS — L70.0 ACNE VULGARIS: ICD-10-CM | Status: IMPROVED

## 2024-05-06 DIAGNOSIS — Z79.899 OTHER LONG TERM (CURRENT) DRUG THERAPY: ICD-10-CM

## 2024-05-06 PROCEDURE — ? ISOTRETINOIN MONITORING

## 2024-05-06 PROCEDURE — ? TREATMENT REGIMEN

## 2024-05-06 PROCEDURE — ? URINE PREGNANCY TEST

## 2024-05-06 PROCEDURE — 99213 OFFICE O/P EST LOW 20 MIN: CPT

## 2024-05-06 PROCEDURE — ? PRESCRIPTION

## 2024-05-06 PROCEDURE — 81025 URINE PREGNANCY TEST: CPT

## 2024-05-06 PROCEDURE — ? COUNSELING

## 2024-05-06 RX ORDER — TRETIONIN 0.25 MG/G
THIN LAYER CREAM TOPICAL QHS
Qty: 45 | Refills: 0 | COMMUNITY
Start: 2024-05-06

## 2024-05-06 RX ADMIN — TRETIONIN THIN LAYER: 0.25 CREAM TOPICAL at 00:00

## 2024-05-06 ASSESSMENT — LOCATION ZONE DERM: LOCATION ZONE: TRUNK

## 2024-05-06 ASSESSMENT — LOCATION SIMPLE DESCRIPTION DERM: LOCATION SIMPLE: ABDOMEN

## 2024-05-06 ASSESSMENT — LOCATION DETAILED DESCRIPTION DERM: LOCATION DETAILED: PERIUMBILICAL SKIN

## 2024-05-06 ASSESSMENT — SEVERITY ASSESSMENT OVERALL AMONG ALL PATIENTS
IN YOUR EXPERIENCE, AMONG ALL PATIENTS YOU HAVE SEEN WITH THIS CONDITION, HOW SEVERE IS THIS PATIENT'S CONDITION?: ALMOST CLEAR

## 2024-05-06 NOTE — PROCEDURE: TREATMENT REGIMEN
Detail Level: Zone
Action 4: Continue
Hide Panoxyl Products: No
Initiate Regimen: Tretinoin in 0.025% nightly as tolerated in a month or so when her oil production feels back to normal.

## 2024-05-06 NOTE — PROCEDURE: ISOTRETINOIN MONITORING
Hypertriglyceridemia Monitoring: I explained this is common when taking isotretinoin. We will monitor closely.
Patient Weight (Optional But Required For Cumulative Dose-Numbers And Decimals Only): 150
Cheilitis Aggressive Treatment: I recommended application of Vaseline or Aquaphor numerous times a day (as often as every hour) and before going to bed. I also prescribed a topical steroid for twice daily use.
Xerosis Aggressive Treatment: I recommended application of Cetaphil or CeraVe numerous times a day going to bed to all dry areas. I also prescribed a topical steroid for twice daily use.
Dosing Month 1 (Required For Cumulative Dosing): 40mg Daily
Myalgia Treatment: I explained this is common when taking isotretinoin. If this worsens they will contact us. They may try OTC ibuprofen.
Xerosis Aggressive Treatment: I recommended application of Cetaphil or CeraVe numerous times a day and before going to bed to all dry areas. I also prescribed a topical steroid for twice daily use.
Is Retinoid Dermatitis Present?: Yes - Normal Treatment
Detail Level: Zone
Any Hypercholesterolemia?: No
Add Associated Diagnosis When Managing Medication Side Effects: Yes
Dosing Month 5 (Required For Cumulative Dosing): 40mg BID
Hypertriglyceridemia Monitoring: I explained this is common when taking isotretinoin. If this worsens they will contact us.
Pounds Preamble Statement (Weight Entered In Details Tab): Reported Weight in pounds:
Retinoid Dermatitis Aggressive Treatment: I recommended more frequent application of Cetaphil or CeraVe to the areas of dermatitis. I also prescribed a topical steroid for twice daily use until the dermatitis resolves.
Cheilitis Normal Treatment: I recommended application of Vaseline or Aquaphor numerous times a day (as often as every hour) and before going to bed.
Any Headache: Yes - Monitoring
Female Pregnancy Counseling Text: Female patients should also be on two forms of birth control while taking this medication and for one month after their last dose.
Kilograms Preamble Statement (Weight Entered In Details Tab): Reported Weight in kilograms:
Ipledge Number (Optional): [id number]
Female Completion Statement: After discussing her treatment course we decided to discontinue isotretinoin therapy at this time. I explained that she would need to continue her birth control methods for at least one month after the last dosage. She should also get a pregnancy test one month after the last dose. She shouldn't donate blood for one month after the last dose. She should call with any new symptoms of depression.
Headache Monitoring: I recommended monitoring the headaches for now. There is no evidence of increased intracranial pressure. They were instructed to call if the headaches are worsening.
What Is The Patient's Gender: Female
Xerosis Normal Treatment: I recommended application of Cetaphil or CeraVe numerous times a day and before going to bed to all dry areas.
Retinoid Dermatitis Normal Treatment: I recommended more frequent application of Cetaphil or CeraVe to the areas of dermatitis.
Xerosis Normal Treatment: I recommended application of Cetaphil or CeraVe numerous times a day going to bed to all dry areas.
Months Of Therapy Completed: 5
Counseling Text: I reviewed the side effect in detail. Patient should get monthly blood tests, not donate blood, not drive at night if vision affected, and not share medication.
Male Completion Statement: After discussing his treatment course we decided to discontinue isotretinoin therapy at this time. He shouldn't donate blood for one month after the last dose. He should call with any new symptoms of depression.
Weight Units: pounds
Nosebleeds Normal Treatment: I explained this is common when taking isotretinoin. I recommended saline mist in each nostril multiple times a day. If this worsens they will contact us.
Lower Range (In Mg/Kg): 120
Next Month's Dosage: Discontinue
Nosebleeds Aggressive Treatment: Vaseline in anterior nares.
Brand Name (Optional): Nexplanon
Target Cumulative Dosage (In Mg/Kg): 135
Any Nosebleeds?: Yes - Aggressive Treatment
Use Therapeutic Ranged Or Therapeutic Target: please select Range or Target

## 2024-05-06 NOTE — PROCEDURE: URINE PREGNANCY TEST
Performed By: Izabella Hogue MA. Preg. test controls validated.
Detail Level: None
Urine Pregnancy Test Result: negative
Expiration Date (Optional): 1/31/25
Lot # (Optional): [id number]

## 2024-05-09 ENCOUNTER — OFFICE VISIT (OUTPATIENT)
Dept: MEDICAL GROUP | Facility: IMAGING CENTER | Age: 24
End: 2024-05-09
Payer: COMMERCIAL

## 2024-05-09 VITALS
DIASTOLIC BLOOD PRESSURE: 80 MMHG | WEIGHT: 157 LBS | OXYGEN SATURATION: 97 % | RESPIRATION RATE: 12 BRPM | TEMPERATURE: 98 F | HEIGHT: 68 IN | SYSTOLIC BLOOD PRESSURE: 120 MMHG | BODY MASS INDEX: 23.79 KG/M2 | HEART RATE: 76 BPM

## 2024-05-09 DIAGNOSIS — E55.9 VITAMIN D INSUFFICIENCY: ICD-10-CM

## 2024-05-09 DIAGNOSIS — Z00.00 WELL ADULT EXAM: ICD-10-CM

## 2024-05-09 DIAGNOSIS — Z02.89 ENCOUNTER FOR COMPLETION OF FORM WITH PATIENT: ICD-10-CM

## 2024-05-09 PROCEDURE — 3074F SYST BP LT 130 MM HG: CPT | Performed by: FAMILY MEDICINE

## 2024-05-09 PROCEDURE — 3079F DIAST BP 80-89 MM HG: CPT | Performed by: FAMILY MEDICINE

## 2024-05-09 PROCEDURE — 99395 PREV VISIT EST AGE 18-39: CPT | Performed by: FAMILY MEDICINE

## 2024-05-09 PROCEDURE — 1126F AMNT PAIN NOTED NONE PRSNT: CPT | Performed by: FAMILY MEDICINE

## 2024-05-09 RX ORDER — TOBRAMYCIN AND DEXAMETHASONE 3; 1 MG/ML; MG/ML
SUSPENSION/ DROPS OPHTHALMIC
COMMUNITY
Start: 2024-05-08

## 2024-05-09 ASSESSMENT — PAIN SCALES - GENERAL: PAINLEVEL: NO PAIN

## 2024-05-09 NOTE — PROGRESS NOTES
Chief Complaint   Patient presents with    Other     PE for medical school     Paperwork     PE for TB paperwork        HPI:  24 y.o. female for well exam.  Eye issues from accutane, eye drops from eye doctor.   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  Needs paperwork completed.    Lifestyle:  Diet: healthy  Exercise/Activities: regular  Work: Will plan to start school in [place]    Health Maintenance:  Last pap: Per gynecology, up to date, September-notes slight abnormal findings this yearly Pap smear was recommended.   Patient's last menstrual period was 04/29/2024.  Immunizations: see below      Health Maintenance Due   Topic Date Due    Cervical Cancer Screening  Never done    Men B Protection - Shared Decision Making  Never done    CHLAMYDIA AND GONORRHEA SCREENING  01/06/2024       Immunization History   Administered Date(s) Administered    Covid-19 Mrna (Spikevax) Moderna 12+ Years 11/02/2023    Dtap Vaccine 2001, 2001, 2001, 05/29/2002, 03/01/2005    HPV Quadrivalent Vaccine (GARDASIL) - HISTORICAL DATA 03/14/2013, 05/15/2013, 09/17/2013    Hepatitis A Vaccine, Ped/Adol 02/25/2004, 03/01/2005    Hepatitis B Vaccine Adolescent/Pediatric 2001, 2001, 2001    Hib Vaccine (Prp-d) - HISTORICAL DATA 2001, 2001, 2001, 05/29/2002    IPV 2001, 2001, 2001, 03/01/2005    Influenza Vaccine Quad Inj (Pf) 02/01/2018, 10/15/2021, 10/20/2022, 11/02/2023    MENING VAC SERO B 2 DOSE SCHED IM (BEXSERO) 09/23/2022, 04/28/2023    MMR Vaccine 02/20/2002, 03/01/2005    Meningococcal Conjugate Vaccine MCV4 (MENVEO) 09/25/2017    Meningococcal Conjugate Vaccine MCV4 (Menactra) 03/14/2013    PFIZER BIVALENT SARS-COV-2 VACCINE (12+) 10/20/2022    PFIZER PURPLE CAP SARS-COV-2 VACCINATION (12+) 03/24/2021, 04/14/2021, 11/26/2021    Pneumococcal Vaccine (PCV7) - HISTORICAL DATA 2001, 2001, 2001, 02/20/2002    QUANTIFERON GOLD - HISTORICAL  DATA 02/05/2018, 08/09/2021    Tdap Vaccine 03/14/2013, 08/31/2022    Varicella Vaccine Live 02/07/2018, 03/08/2018         Review of Systems   Constitutional: Negative for fever, chills and malaise/fatigue.   HENT: Negative for congestion, sore throat, or swallowing issues.   Eyes: Negative for pain or vision changes.   Respiratory: Negative for cough and shortness of breath.    Cardiovascular: Negative for leg swelling. No chest pain.   Gastrointestinal: Negative for nausea, vomiting, abdominal pain and diarrhea.   Genitourinary: Negative for dysuria and hematuria.   Skin: seeing dermatology  Neurological: Negative for dizziness, focal weakness and headaches.   Endo/Heme/Allergies: Does not bruise/bleed easily.   Psychiatric/Behavioral: Negative for depression.  The patient is not nervous/anxious.          Current Outpatient Medications:     tobramycin-dexamethasone (TOBRADEX) 0.3-0.1 % Suspension, , Disp: , Rfl:     ABSORICA 40 MG capsule, Take 40 mg by mouth 2 times a day. Take one tab twice a day, Disp: , Rfl:     Etonogestrel (NEXPLANON SC), Inject 1 Each under the skin see administration instructions. Every 3 years, Disp: , Rfl:     Allergies   Allergen Reactions    Naproxen Diarrhea     N/V/D       Patient Active Problem List   Diagnosis    Fatigue    Scoliosis of thoracic spine    Migraine with aura and with status migrainosus, not intractable    Chronic pain of left ankle    Diarrhea    Abdominal cramping       Past Medical History:   Diagnosis Date    COVID-19     Migraine 11/2017    Neck pain 9/24/2018    Sinusitis 09/2018       History reviewed. No pertinent surgical history.    Family History   Problem Relation Age of Onset    Cancer Mother         melanoma    Cancer Maternal Grandmother         NHL, uterine cancer    Arrythmia Maternal Grandfather     Thyroid Maternal Grandfather     Hypertension Maternal Grandfather     Cancer Paternal Grandmother         melanoma    Anxiety disorder Paternal  "Grandfather        Social History     Tobacco Use    Smoking status: Never    Smokeless tobacco: Never   Vaping Use    Vaping Use: Never used   Substance Use Topics    Alcohol use: Not Currently     Alcohol/week: 2.4 oz     Types: 4 Glasses of wine per week     Comment: varies/ weekends    Drug use: No         PHYSICAL EXAM:  /80 (BP Location: Left arm, Patient Position: Sitting, BP Cuff Size: Adult)   Pulse 76   Temp 36.7 °C (98 °F) (Temporal)   Resp 12   Ht 1.727 m (5' 8\")   Wt 71.2 kg (157 lb)   LMP 04/29/2024   SpO2 97%   BMI 23.87 kg/m²   Constitutional: She appears well-developed and well-nourished. She appears not diaphoretic. No distress.   HENT: Right Ear: External ear normal. Left Ear: External ear normal. Tympanic membranes clear and intact.   Nose: Nose normal.   Mouth/Throat: Oropharynx is clear and moist. No oropharyngeal exudate.     Eyes: Conjunctivae and extraocular motions are normal. Pupils are equal, round, and reactive to light. No scleral icterus.   Neck: Normal range of motion. Neck supple. No thyromegaly present.   Cardiovascular: Normal rate, regular rhythm, normal heart sounds and intact distal pulses.  Exam reveals no gallop and no friction rub.  No murmurs.  Pulmonary/Chest: Effort normal and breath sounds normal. No respiratory distress. She has no wheezes. She has no rales.   Abdominal: Soft. Bowel sounds are normal. She exhibits no distension and no mass. No tenderness. She has no rebound and no guarding.   Lymphadenopathy:  She has no cervical adenopathy.   Neurological: She is alert. She has normal reflexes. No cranial nerve deficit. She exhibits normal muscle tone.   Skin: Skin is warm and dry. No rash noted. She is not diaphoretic. No erythema.  Psychiatric: She has a normal mood and affect. Her behavior is normal.   Musculoskeletal: She exhibits no edema. Full strength throughout. 2+ DTR throughout.       ASSESSMENT/PLAN:    This is a 24 y.o. female for well exam. "     1. Well adult exam -otherwise healthy female.  Recommend routine gynecological visit and Pap smear with gynecologist.       2. Vitamin D insufficiency-noted on past labs.  Plan to monitor in future. VITAMIN D,25 HYDROXY (DEFICIENCY)      3. Encounter for completion of form with patient   Form reviewed and signed for patient.       Reviewed anticipatory care.  Reviewed healthcare maintenance and immunization recommendations.    Recommend healthy diet and routine exercise.  Recommend routine annual well visit.       Follow-up as needed        This medical record contains text that has been entered with the assistance of computer voice recognition and dictation software.  Therefore, it may contain unintended errors in text, spelling, punctuation, or grammar.

## 2024-06-12 ENCOUNTER — APPOINTMENT (RX ONLY)
Dept: URBAN - METROPOLITAN AREA CLINIC 35 | Facility: CLINIC | Age: 24
Setting detail: DERMATOLOGY
End: 2024-06-12

## 2024-06-12 DIAGNOSIS — Z79.899 OTHER LONG TERM (CURRENT) DRUG THERAPY: ICD-10-CM

## 2024-06-12 DIAGNOSIS — D18.0 HEMANGIOMA: ICD-10-CM

## 2024-06-12 DIAGNOSIS — D22 MELANOCYTIC NEVI: ICD-10-CM

## 2024-06-12 PROBLEM — D23.71 OTHER BENIGN NEOPLASM OF SKIN OF RIGHT LOWER LIMB, INCLUDING HIP: Status: ACTIVE | Noted: 2024-06-12

## 2024-06-12 PROBLEM — D22.62 MELANOCYTIC NEVI OF LEFT UPPER LIMB, INCLUDING SHOULDER: Status: ACTIVE | Noted: 2024-06-12

## 2024-06-12 PROBLEM — D22.39 MELANOCYTIC NEVI OF OTHER PARTS OF FACE: Status: ACTIVE | Noted: 2024-06-12

## 2024-06-12 PROBLEM — D18.01 HEMANGIOMA OF SKIN AND SUBCUTANEOUS TISSUE: Status: ACTIVE | Noted: 2024-06-12

## 2024-06-12 PROCEDURE — ? COUNSELING

## 2024-06-12 PROCEDURE — ? URINE PREGNANCY TEST

## 2024-06-12 PROCEDURE — 81025 URINE PREGNANCY TEST: CPT

## 2024-06-12 PROCEDURE — 99212 OFFICE O/P EST SF 10 MIN: CPT

## 2024-06-12 ASSESSMENT — LOCATION ZONE DERM
LOCATION ZONE: SCALP
LOCATION ZONE: FACE
LOCATION ZONE: ARM

## 2024-06-12 ASSESSMENT — LOCATION DETAILED DESCRIPTION DERM
LOCATION DETAILED: RIGHT CENTRAL MALAR CHEEK
LOCATION DETAILED: RIGHT CENTRAL FRONTAL SCALP
LOCATION DETAILED: LEFT DISTAL DORSAL FOREARM
LOCATION DETAILED: LEFT CENTRAL MALAR CHEEK

## 2024-06-12 ASSESSMENT — LOCATION SIMPLE DESCRIPTION DERM
LOCATION SIMPLE: RIGHT CHEEK
LOCATION SIMPLE: RIGHT SCALP
LOCATION SIMPLE: LEFT CHEEK
LOCATION SIMPLE: LEFT FOREARM

## 2024-06-12 NOTE — PROCEDURE: URINE PREGNANCY TEST
Expiration Date (Optional): 12/28/2025
Urine Pregnancy Test Result: negative
Lot # (Optional): [id number]
Performed By: Izabella Hogue MA, pregnancy test control validated
Detail Level: None

## 2025-08-02 ENCOUNTER — HOSPITAL ENCOUNTER (EMERGENCY)
Facility: MEDICAL CENTER | Age: 25
End: 2025-08-02
Attending: STUDENT IN AN ORGANIZED HEALTH CARE EDUCATION/TRAINING PROGRAM
Payer: COMMERCIAL

## 2025-08-02 VITALS
DIASTOLIC BLOOD PRESSURE: 65 MMHG | HEIGHT: 68 IN | TEMPERATURE: 97.1 F | HEART RATE: 74 BPM | WEIGHT: 151.01 LBS | RESPIRATION RATE: 13 BRPM | OXYGEN SATURATION: 97 % | BODY MASS INDEX: 22.89 KG/M2 | SYSTOLIC BLOOD PRESSURE: 112 MMHG

## 2025-08-02 DIAGNOSIS — G24.02 DYSTONIC DRUG REACTION: ICD-10-CM

## 2025-08-02 DIAGNOSIS — T88.7XXA MEDICATION SIDE EFFECT: Primary | ICD-10-CM

## 2025-08-02 LAB
ALBUMIN SERPL BCP-MCNC: 4.1 G/DL (ref 3.2–4.9)
ALBUMIN/GLOB SERPL: 1.4 G/DL
ALP SERPL-CCNC: 51 U/L (ref 30–99)
ALT SERPL-CCNC: 13 U/L (ref 2–50)
AMMONIA PLAS-SCNC: 24 UMOL/L (ref 11–45)
ANION GAP SERPL CALC-SCNC: 13 MMOL/L (ref 7–16)
AST SERPL-CCNC: 18 U/L (ref 12–45)
BASOPHILS # BLD AUTO: 0.3 % (ref 0–1.8)
BASOPHILS # BLD: 0.03 K/UL (ref 0–0.12)
BILIRUB SERPL-MCNC: <0.2 MG/DL (ref 0.1–1.5)
BUN SERPL-MCNC: 13 MG/DL (ref 8–22)
CALCIUM ALBUM COR SERPL-MCNC: 8.8 MG/DL (ref 8.5–10.5)
CALCIUM SERPL-MCNC: 8.9 MG/DL (ref 8.5–10.5)
CHLORIDE SERPL-SCNC: 104 MMOL/L (ref 96–112)
CO2 SERPL-SCNC: 20 MMOL/L (ref 20–33)
CREAT SERPL-MCNC: 0.87 MG/DL (ref 0.5–1.4)
EOSINOPHIL # BLD AUTO: 0.2 K/UL (ref 0–0.51)
EOSINOPHIL NFR BLD: 2 % (ref 0–6.9)
ERYTHROCYTE [DISTWIDTH] IN BLOOD BY AUTOMATED COUNT: 41.8 FL (ref 35.9–50)
GFR SERPLBLD CREATININE-BSD FMLA CKD-EPI: 95 ML/MIN/1.73 M 2
GLOBULIN SER CALC-MCNC: 2.9 G/DL (ref 1.9–3.5)
GLUCOSE SERPL-MCNC: 92 MG/DL (ref 65–99)
HCT VFR BLD AUTO: 38.1 % (ref 37–47)
HGB BLD-MCNC: 12.6 G/DL (ref 12–16)
IMM GRANULOCYTES # BLD AUTO: 0.03 K/UL (ref 0–0.11)
IMM GRANULOCYTES NFR BLD AUTO: 0.3 % (ref 0–0.9)
LYMPHOCYTES # BLD AUTO: 4.15 K/UL (ref 1–4.8)
LYMPHOCYTES NFR BLD: 42 % (ref 22–41)
MCH RBC QN AUTO: 30.4 PG (ref 27–33)
MCHC RBC AUTO-ENTMCNC: 33.1 G/DL (ref 32.2–35.5)
MCV RBC AUTO: 92 FL (ref 81.4–97.8)
MONOCYTES # BLD AUTO: 0.8 K/UL (ref 0–0.85)
MONOCYTES NFR BLD AUTO: 8.1 % (ref 0–13.4)
NEUTROPHILS # BLD AUTO: 4.68 K/UL (ref 1.82–7.42)
NEUTROPHILS NFR BLD: 47.3 % (ref 44–72)
NRBC # BLD AUTO: 0 K/UL
NRBC BLD-RTO: 0 /100 WBC (ref 0–0.2)
PLATELET # BLD AUTO: 210 K/UL (ref 164–446)
PMV BLD AUTO: 12.3 FL (ref 9–12.9)
POTASSIUM SERPL-SCNC: 3.9 MMOL/L (ref 3.6–5.5)
PROT SERPL-MCNC: 7 G/DL (ref 6–8.2)
RBC # BLD AUTO: 4.14 M/UL (ref 4.2–5.4)
SODIUM SERPL-SCNC: 137 MMOL/L (ref 135–145)
VALPROATE SERPL-MCNC: 92 UG/ML (ref 50–100)
WBC # BLD AUTO: 9.9 K/UL (ref 4.8–10.8)

## 2025-08-02 PROCEDURE — 36415 COLL VENOUS BLD VENIPUNCTURE: CPT

## 2025-08-02 PROCEDURE — 99284 EMERGENCY DEPT VISIT MOD MDM: CPT

## 2025-08-02 PROCEDURE — 700111 HCHG RX REV CODE 636 W/ 250 OVERRIDE (IP): Mod: JZ | Performed by: STUDENT IN AN ORGANIZED HEALTH CARE EDUCATION/TRAINING PROGRAM

## 2025-08-02 PROCEDURE — 96374 THER/PROPH/DIAG INJ IV PUSH: CPT

## 2025-08-02 PROCEDURE — 82140 ASSAY OF AMMONIA: CPT

## 2025-08-02 PROCEDURE — 80164 ASSAY DIPROPYLACETIC ACD TOT: CPT

## 2025-08-02 PROCEDURE — 85025 COMPLETE CBC W/AUTO DIFF WBC: CPT

## 2025-08-02 PROCEDURE — 80053 COMPREHEN METABOLIC PANEL: CPT

## 2025-08-02 RX ORDER — MIDAZOLAM HYDROCHLORIDE 1 MG/ML
2 INJECTION INTRAMUSCULAR; INTRAVENOUS ONCE
Status: COMPLETED | OUTPATIENT
Start: 2025-08-02 | End: 2025-08-02

## 2025-08-02 RX ADMIN — MIDAZOLAM HYDROCHLORIDE 2 MG: 1 INJECTION, SOLUTION INTRAMUSCULAR; INTRAVENOUS at 21:32
